# Patient Record
Sex: FEMALE | Race: WHITE | Employment: OTHER | URBAN - METROPOLITAN AREA
[De-identification: names, ages, dates, MRNs, and addresses within clinical notes are randomized per-mention and may not be internally consistent; named-entity substitution may affect disease eponyms.]

---

## 2017-03-17 ENCOUNTER — OFFICE VISIT (OUTPATIENT)
Dept: OBGYN CLINIC | Age: 22
End: 2017-03-17

## 2017-03-17 VITALS
WEIGHT: 180 LBS | SYSTOLIC BLOOD PRESSURE: 118 MMHG | HEIGHT: 66 IN | BODY MASS INDEX: 28.93 KG/M2 | DIASTOLIC BLOOD PRESSURE: 60 MMHG

## 2017-03-17 DIAGNOSIS — Z01.419 ENCOUNTER FOR GYNECOLOGICAL EXAMINATION WITHOUT ABNORMAL FINDING: Primary | ICD-10-CM

## 2017-03-17 RX ORDER — NORGESTREL AND ETHINYL ESTRADIOL 0.3-0.03MG
KIT ORAL
Refills: 0 | COMMUNITY
Start: 2017-02-05 | End: 2017-03-17 | Stop reason: SDUPTHER

## 2017-03-17 NOTE — PATIENT INSTRUCTIONS
Breast Self-Exam: Care Instructions  Your Care Instructions  A breast self-exam is when you check your breasts for lumps or changes. This regular exam helps you learn how your breasts normally look and feel. Most breast problems or changes are not because of cancer. Breast self-exam is not a substitute for a mammogram. Having regular breast exams by your doctor and regular mammograms improve your chances of finding any problems with your breasts. Some women set a time each month to do a step-by-step breast self-exam. Other women like a less formal system. They might look at their breasts as they brush their teeth, or feel their breasts once in a while in the shower. If you notice a change in your breast, tell your doctor. Follow-up care is a key part of your treatment and safety. Be sure to make and go to all appointments, and call your doctor if you are having problems. Its also a good idea to know your test results and keep a list of the medicines you take. How do you do a breast self-exam?  · The best time to examine your breasts is usually one week after your menstrual period begins. Your breasts should not be tender then. If you do not have periods, you might do your exam on a day of the month that is easy to remember. · To examine your breasts:  ¨ Remove all your clothes above the waist and lie down. When you are lying down, your breast tissue spreads evenly over your chest wall, which makes it easier to feel all your breast tissue. ¨ Use the pads--not the fingertips--of the 3 middle fingers of your left hand to check your right breast. Move your fingers slowly in small coin-sized circles that overlap. ¨ Use three levels of pressure to feel of all your breast tissue. Use light pressure to feel the tissue close to the skin surface. Use medium pressure to feel a little deeper. Use firm pressure to feel your tissue close to your breastbone and ribs.  Use each pressure level to feel your breast tissue before moving on to the next spot. ¨ Check your entire breast, moving up and down as if following a strip from the collarbone to the bra line, and from the armpit to the ribs. Repeat until you have covered the entire breast.  ¨ Repeat this procedure for your left breast, using the pads of the 3 middle fingers of your right hand. · To examine your breasts while in the shower:  ¨ Place one arm over your head and lightly soap your breast on that side. ¨ Using the pads of your fingers, gently move your hand over your breast (in the strip pattern described above), feeling carefully for any lumps or changes. ¨ Repeat for the other breast.  · Have your doctor inspect anything you notice to see if you need further testing. Where can you learn more? Go to http://darin-bhargav.info/. Enter P148 in the search box to learn more about \"Breast Self-Exam: Care Instructions. \"  Current as of: July 26, 2016  Content Version: 11.1  © 6931-9038 [employer]. Care instructions adapted under license by [employer] (which disclaims liability or warranty for this information). If you have questions about a medical condition or this instruction, always ask your healthcare professional. [place] [address] any warranty or liability for your use of this information.

## 2017-03-17 NOTE — PROGRESS NOTES
Cate Palmer is a ,  24 y.o. female [facility] whose Patient's last menstrual period was 2017. who presents for her annual checkup. She is having no significant problems. With regard to the Gardisil vaccine, she has not received it yet. Menstrual status:    Her periods are normal in flow. She is using three to five pads or tampons per day, usually regular and occur every 26-30 days. She denies dysmenorrhea. She reports no premenstrual symptoms. Contraception:    The current method of family planning is abstinence. Sexual history:    She has never been sexually active. Medical conditions:    Since her last annual GYN exam about one year ago, she has not the following changes in her health history: none. Pap and Mammogram History:    She has never had a pap smear due to age/protocol. The patient has never had a mammogram.    The patient does have a family history of breast cancer. Past Medical History:   Diagnosis Date    Abnormal thyroid stimulating hormone level     Autism spectrum disorder     pervasive development disorder not otherwise specified, autism    Eczema     Optic nerve disorder     enlarged optic nerve, glaucoma suspect, Dr. Rojas Plankyle disease ([email or web address]     trace bales/gyn-July hematolgy- DR. ZAPIEN     Past Surgical History:   Procedure Laterality Date    HX HEENT      oral surgery; X2    HX WISDOM TEETH EXTRACTION         Current Outpatient Prescriptions   Medication Sig Dispense Refill    LOW-OGESTREL, 28, 0.3-30 mg-mcg tab TK 1 T PO ONCE DAILY  0    fexofenadine (ALLEGRA) 180 mg tablet Take  by mouth daily.  multivitamin (ONE A DAY) tablet Take 1 Tab by mouth daily.  triamcinolone acetonide (KENALOG) 0.1 % ointment   1     Allergies: Milk;  Other medication; and Other plant, animal, environmental   Social History     Social History    Marital status: SINGLE     Spouse name: N/A    Number of children: N/A    Years of education: N/A     Occupational History    Not on file. Social History Main Topics    Smoking status: Never Smoker    Smokeless tobacco: Never Used    Alcohol use No    Drug use: No    Sexual activity: No     Other Topics Concern    Not on file     Social History Narrative    Full time student at [employer], music education,music therapy        Manageable        Sleeps 7 hours/night        Exercise     walking a dog 1 mile     Tobacco History:  reports that she has never smoked. She has never used smokeless tobacco.  Alcohol Abuse:  reports that she does not drink alcohol. Drug Abuse:  reports that she does not use illicit drugs.     Patient Active Problem List   Diagnosis Code    [facility] [place] [facility] D68.0       Review of Systems - History obtained from the patient  Constitutional: negative for weight loss, fever, night sweats  HEENT: negative for hearing loss, earache, congestion, snoring, sorethroat  CV: negative for chest pain, palpitations, edema  Resp: negative for cough, shortness of breath, wheezing  GI: negative for change in bowel habits, abdominal pain, black or bloody stools  : negative for frequency, dysuria, hematuria, vaginal discharge  MSK: negative for back pain, joint pain, muscle pain  Breast: negative for breast lumps, nipple discharge, galactorrhea  Skin :negative for itching, rash, hives  Neuro: negative for dizziness, headache, confusion, weakness  Psych: negative for anxiety, depression, change in mood  Heme/lymph: negative for bleeding, bruising, pallor    Physical Exam    Visit Vitals    /60    Ht 5' 6\" (1.676 m)    Wt 180 lb (81.6 kg)    LMP 03/13/2017    BMI 29.05 kg/m2       Constitutional  · Appearance: well-nourished, well developed, alert, in no acute distress    HENT  · Head and Face: appears normal    Neck  · Inspection/Palpation: normal appearance, no masses or tenderness  · Lymph Nodes: no lymphadenopathy present  · Thyroid: gland size normal, nontender, no nodules or masses present on palpation    Chest  · Respiratory Effort: breathing normal  · Auscultation: normal breath sounds    Cardiovascular  · Heart:  · Auscultation: regular rate and rhythm without murmur    Gastrointestinal  · Abdominal Examination: abdomen non-tender to palpation, normal bowel sounds, no masses present  · Liver and spleen: no hepatomegaly present, spleen not palpable  · Hernias: no hernias identified      Skin  · General Inspection: no rash, no lesions identified    Neurologic/Psychiatric  · Mental Status:  · Orientation: grossly oriented to person, place and time  · Mood and Affect: mood normal, affect appropriate    . Assessment:  Routine gynecologic examination  Her current medical status is satisfactory with no evidence of significant gynecologic issues.   Von Willebrand disease  Plan:  Counseled re: diet, exercise, healthy lifestyle  Return for yearly wellness visits  Gardisil counseling provided  Cont OC

## 2017-06-29 ENCOUNTER — OFFICE VISIT (OUTPATIENT)
Dept: INTERNAL MEDICINE CLINIC | Age: 22
End: 2017-06-29

## 2017-06-29 VITALS
HEIGHT: 66 IN | DIASTOLIC BLOOD PRESSURE: 62 MMHG | OXYGEN SATURATION: 98 % | BODY MASS INDEX: 28.93 KG/M2 | WEIGHT: 180 LBS | SYSTOLIC BLOOD PRESSURE: 109 MMHG | RESPIRATION RATE: 18 BRPM | HEART RATE: 95 BPM | TEMPERATURE: 99.3 F

## 2017-06-29 DIAGNOSIS — Z00.00 WELL ADULT EXAM: Primary | ICD-10-CM

## 2017-06-29 DIAGNOSIS — Z00.00 WELL ADULT EXAM: ICD-10-CM

## 2017-06-29 DIAGNOSIS — D68.00 VON WILLEBRAND DISEASE: ICD-10-CM

## 2017-06-29 NOTE — MR AVS SNAPSHOT
Visit Information Date & Time Provider Department Dept. Phone Encounter #  
 6/29/2017  2:45 PM Jinny Hashimoto, MD [facility] [address] [place] [id number] Upcoming Health Maintenance Date Due  
 HPV AGE 9Y-34Y (1 of 3 - Female 3 Dose Series) 5/6/2005 PAP AKA CERVICAL CYTOLOGY 5/6/2015 INFLUENZA AGE 9 TO ADULT 8/1/2017 DTaP/Tdap/Td series (2 - Td) 1/1/2021 Allergies as of 6/29/2017  Review Complete On: 6/29/2017 By: Jinny Hashimoto, MD  
  
 Severity Noted Reaction Type Reactions Milk  04/10/2015    Nausea and Vomiting Other Medication  01/04/2014   Not Verified Not Reported This Time Neosporin Other Plant, Animal, Environmental  01/04/2014   Not Verified Not Reported This Time Cats and dogs Current Immunizations  Never Reviewed Name Date Influenza Vaccine 10/1/2014 Tdap 1/1/2011 Not reviewed this visit You Were Diagnosed With   
  
 Codes Comments Well adult exam    -  Primary ICD-10-CM: Z00.00 ICD-9-CM: V70.0 Vitals BP Pulse Temp Resp Height(growth percentile) Weight(growth percentile) 109/62 95 99.3 °F (37.4 °C) (Oral) 18 5' 6\" (1.676 m) 180 lb (81.6 kg) LMP SpO2 BMI OB Status Smoking Status 05/15/2017 98% 29.05 kg/m2 Having regular periods Never Smoker Vitals History BMI and BSA Data Body Mass Index Body Surface Area 29.05 kg/m 2 1.95 m 2 Preferred Pharmacy Pharmacy Name Phone [facility] [address] [facility] [phone] Your Updated Medication List  
  
   
This list is accurate as of: 6/29/17  4:06 PM.  Always use your most recent med list. ALLEGRA 180 mg tablet Generic drug:  fexofenadine Take  by mouth daily. multivitamin tablet Commonly known as:  ONE A DAY Take 1 Tab by mouth daily. norgestrel-ethinyl estradiol 0.3-30 mg-mcg Tab Commonly known as:  LOW-OGESTREL (28) Take 1 Tab by mouth daily. To-Do List   
 06/29/2017 Lab:  LIPID PANEL   
  
 06/29/2017 Lab:  METABOLIC PANEL, COMPREHENSIVE Please provide this summary of care documentation to your next provider. Your primary care clinician is listed as Esme Lujan. If you have any questions after today's visit, please call [phone].

## 2017-06-29 NOTE — PROGRESS NOTES
Ron Fallon is a 20 y.o. female and presents with Complete Physical (forms filled out for college)  . Subjective:  Pt and sister, Aki, presents for pt's visit. Family moved to area 7 years ago. Lived in [place], [place]  Pt has diagnosis of Von Willebrands disease and was followed by [facility] but will go to community      Von willebrands disease  Not required Ddavp over past year  Pre surgery requires ddavp, obtains from hematologist, Dr. Nohelia Shook  Pt sees Dr. Otoniel Reis for [facility]    Exercising: On line gym classes: step classes        Review of Systems  Constitutional: negative for fevers, chills, anorexia and weight loss  Eyes:   negative for visual disturbance and irritation  ENT:   negative for tinnitus,sore throat,nasal congestion,ear pains. hoarseness  Respiratory:  negative for cough, hemoptysis, dyspnea,wheezing  CV:   negative for chest pain, palpitations, lower extremity edema  GI:   negative for nausea, vomiting, diarrhea, abdominal pain,melena  Endo:               negative for polyuria,polydipsia,polyphagia,heat intolerance  Genitourinary: negative for frequency, dysuria and hematuria  Integument:  negative for rash and pruritus  Hematologic:  negative for easy bruising and gum/nose bleeding  Musculoskel: negative for myalgias, arthralgias, back pain, muscle weakness, joint pain  Neurological:  negative for headaches, dizziness, vertigo, memory problems and gait   Behavl/Psych: negative for feelings of anxiety, depression, mood changes    Past Medical History:   Diagnosis Date    Abnormal thyroid stimulating hormone level     Autism spectrum disorder     pervasive development disorder not otherwise specified, autism    Eczema     Optic nerve disorder     enlarged optic nerve, glaucoma suspect, Dr. Gleason Rear disease ([facility]     trace bales/gyn-July hematolgy- DR. ZAPIEN     Past Surgical History:   Procedure Laterality Date    HX HEENT      oral surgery; X2  HX WISDOM TEETH EXTRACTION       Social History     Social History    Marital status: SINGLE     Spouse name: N/A    Number of children: N/A    Years of education: N/A     Social History Main Topics    Smoking status: Never Smoker    Smokeless tobacco: Never Used    Alcohol use No    Drug use: No    Sexual activity: No     Other Topics Concern    None     Social History Narrative    Full time student at [employer], music education,music therapy        Manageable        Sleeps 7 hours/night        Exercise     walking a dog 1 mile     Family History   Problem Relation Age of Onset    Hypertension Mother     High Cholesterol Mother     Thyroid Disease Mother     Breast Cancer Mother 39    Cancer Mother      breast/thyroid    Diabetes Father     Hypertension Father     High Cholesterol Father     Thyroid Disease Father     Heart Disease Maternal Grandmother     Cancer Maternal Grandmother      colon cancer    Heart Disease Maternal Grandfather     Diabetes Maternal Grandfather     Heart Attack Maternal Grandfather     Diabetes Sister      Current Outpatient Prescriptions   Medication Sig Dispense Refill    norgestrel-ethinyl estradiol (LOW-OGESTREL, 28,) 0.3-30 mg-mcg tab Take 1 Tab by mouth daily. 3 Package 4    fexofenadine (ALLEGRA) 180 mg tablet Take  by mouth daily.  multivitamin (ONE A DAY) tablet Take 1 Tab by mouth daily.        Allergies   Allergen Reactions    Milk Nausea and Vomiting    Other Medication Not Reported This Time     Neosporin    Other Plant, Animal, Environmental Not Reported This Time     Cats and dogs       Objective:  Visit Vitals    /89    Pulse 95    Temp 99.3 °F (37.4 °C) (Oral)    Resp 18    Ht 5' 6\" (1.676 m)    Wt 180 lb (81.6 kg)    LMP 05/15/2017    SpO2 98%    BMI 29.05 kg/m2     Physical Exam:   General appearance - alert, well appearing, and in no distress  Mental status - alert, oriented to person, place, and time  EYE-EMRE, EOMI, corneas normal, no foreign bodies, visual acuity normal both eyes, no periorbital cellulitis  ENT-ENT exam normal, no neck nodes or sinus tenderness  Nose - normal and patent, no erythema, discharge or polyps  Mouth - mucous membranes moist, pharynx normal without lesions  Neck - supple, no significant adenopathy, fullness right thyroid  Chest - clear to auscultation, no wheezes, rales or rhonchi, symmetric air entry   Heart - normal rate, regular rhythm, normal S1, S2, no murmurs, rubs, clicks or gallops   Abdomen - soft, nontender, nondistended, no masses or organomegaly  Lymph- no adenopathy palpable  Ext-peripheral pulses normal, no pedal edema, no clubbing or cyanosis  Skin-Warm and dry. no hyperpigmentation, vitiligo, or suspicious lesions  Neuro -alert, oriented, normal speech, no focal findings or movement disorder noted  Neck-normal C-spine, no tenderness, full ROM without pain      Results for orders placed or performed in visit on 04/10/15   LIPID PANEL   Result Value Ref Range    Cholesterol, total 168 100 - 189 mg/dL    Triglyceride 74 0 - 114 mg/dL    HDL Cholesterol 39 (L) >39 mg/dL    VLDL, calculated 15 5 - 40 mg/dL    LDL, calculated 114 0 - 119 mg/dL   TSH, 3RD GENERATION   Result Value Ref Range    TSH 2.140 0.450 - 9.336 uIU/mL   METABOLIC PANEL, COMPREHENSIVE   Result Value Ref Range    Glucose 89 65 - 99 mg/dL    BUN 8 6 - 20 mg/dL    Creatinine 0.62 0.57 - 1.00 mg/dL    GFR est non- >59 mL/min/1.73    GFR est  >59 mL/min/1.73    BUN/Creatinine ratio 13 8 - 20    Sodium 139 134 - 144 mmol/L    Potassium 4.3 3.5 - 5.2 mmol/L    Chloride 101 97 - 108 mmol/L    CO2 23 18 - 29 mmol/L    Calcium 9.6 8.7 - 10.2 mg/dL    Protein, total 7.6 6.0 - 8.5 g/dL    Albumin 4.7 3.5 - 5.5 g/dL    GLOBULIN, TOTAL 2.9 1.5 - 4.5 g/dL    A-G Ratio 1.6 1.1 - 2.5    Bilirubin, total 0.4 0.0 - 1.2 mg/dL    Alk.  phosphatase 55 39 - 117 IU/L    AST (SGOT) 18 0 - 40 IU/L    ALT (SGPT) 16 0 - 32 IU/L   HEMOGLOBIN A1C   Result Value Ref Range    Hemoglobin A1c 5.4 4.8 - 5.6 %   CVD REPORT   Result Value Ref Range    INTERPRETATION Note      Prevention    Cardiovascular profile  Family hx  Exercising: On line gym classes  Blood pressure:  Health healthy diet:  Diabetes:  Cholesterol:  Renal function:      Cancer risk profile  Mammogram  PSA  Lung  Colonoscopy  Skin nonhealing in 2 weeks  Gyn abnormal bleeding/discharge/abd pain/pressure      Thyroid sx    Osteopenia prevention  Calcium 1000mg/day yes  Vitamin D 800iu/day yes  Milk allergy    Mental health scale: 9/10 Depression  Anxiety  Sleep # of hours:  Energy Level:        Immunizations  TDAP  Pneumonia vaccine  Flu vaccine  Shingles vaccine  HPV      Glenn Elias was seen today for complete physical.    Diagnoses and all orders for this visit:    Well adult exam  -     METABOLIC PANEL, COMPREHENSIVE; Future  -     LIPID PANEL; Future    Von Willebrand disease ([facility]  Stable  Follow up with hematology    This note will not be viewable in [address]

## 2017-07-19 LAB
ALBUMIN SERPL-MCNC: 4.5 G/DL (ref 3.5–5.5)
ALBUMIN/GLOB SERPL: 1.4 {RATIO} (ref 1.2–2.2)
ALP SERPL-CCNC: 56 IU/L (ref 39–117)
ALT SERPL-CCNC: 16 IU/L (ref 0–32)
AST SERPL-CCNC: 15 IU/L (ref 0–40)
BILIRUB SERPL-MCNC: <0.2 MG/DL (ref 0–1.2)
BUN SERPL-MCNC: 8 MG/DL (ref 6–20)
BUN/CREAT SERPL: 12 (ref 9–23)
CALCIUM SERPL-MCNC: 9.6 MG/DL (ref 8.7–10.2)
CHLORIDE SERPL-SCNC: 100 MMOL/L (ref 96–106)
CHOLEST SERPL-MCNC: 159 MG/DL (ref 100–199)
CO2 SERPL-SCNC: 21 MMOL/L (ref 18–29)
CREAT SERPL-MCNC: 0.66 MG/DL (ref 0.57–1)
GLOBULIN SER CALC-MCNC: 3.2 G/DL (ref 1.5–4.5)
GLUCOSE SERPL-MCNC: 91 MG/DL (ref 65–99)
HDLC SERPL-MCNC: 42 MG/DL
INTERPRETATION, 910389: NORMAL
LDLC SERPL CALC-MCNC: 103 MG/DL (ref 0–99)
POTASSIUM SERPL-SCNC: 4.3 MMOL/L (ref 3.5–5.2)
PROT SERPL-MCNC: 7.7 G/DL (ref 6–8.5)
SODIUM SERPL-SCNC: 140 MMOL/L (ref 134–144)
TRIGL SERPL-MCNC: 69 MG/DL (ref 0–149)
VLDLC SERPL CALC-MCNC: 14 MG/DL (ref 5–40)

## 2018-02-05 ENCOUNTER — TELEPHONE (OUTPATIENT)
Dept: OBGYN CLINIC | Age: 23
End: 2018-02-05

## 2018-02-05 NOTE — TELEPHONE ENCOUNTER
Call received ate 10:42am      CLARITASHRAVAN verified to speak to Scooter Coulter, mother, regarding her daughter. 20year old patient last seen in the office on 3/17/18. Patient is on the Autism spectrum. Patient also has Raiza Cozier Disease . Mother calling to say that her daughter takes Low ogestrel 29. Mother states patient has been bleeding for 8 days and she is 4 pill away from the placebo pills when her cycle will come on Mother reports her cycle is heavy. .Mother reports the bleeding is mid afternoon till she goes to bed. Mother reports no bleeding over night and in the am. Mother is not sure how often pad her tampon is changed. Mother is concerned due to past history of having low hemoglobin 5.5 and having to have transfusion.       Please advise

## 2018-02-05 NOTE — TELEPHONE ENCOUNTER
Is this the first pack where she has had BTB? If so, go through another pack. If it's happened before I will change this pill. How heavy is the bleeding?  How many pads/day

## 2018-02-06 NOTE — TELEPHONE ENCOUNTER
HIPAA verified to speak to Governor Moors, regarding her daughter. Mother advised of MD recommendations. Mother reports this is the first time the BTB has happened since taking this ocp for 12 years. Mother reports that her daughter only uses one pad for the time she is bleeding. Mother still concerned regarding period to come.        Mother wondering if should advised her daughter's hematologist , ?is this bleeding a change in her [address]      Please advise

## 2018-02-06 NOTE — TELEPHONE ENCOUNTER
No it is just related to the pill. She is barely bleeding at one pad per day. It's due to thinning of the lining. If she is concerned and wants to come in this week please schedule.  Otherwise, let me know if she had BTB again next pack and I will switch the pill

## 2018-02-06 NOTE — TELEPHONE ENCOUNTER
HIPAA verified to speak to speak to Jonathan Cazaresmadhu , regarding her daughter. Mother advised of MD recommendations and verbalized understanding.

## 2018-03-27 ENCOUNTER — OFFICE VISIT (OUTPATIENT)
Dept: OBGYN CLINIC | Age: 23
End: 2018-03-27

## 2018-03-27 VITALS
SYSTOLIC BLOOD PRESSURE: 124 MMHG | DIASTOLIC BLOOD PRESSURE: 76 MMHG | HEIGHT: 66 IN | BODY MASS INDEX: 25.91 KG/M2 | WEIGHT: 161.2 LBS

## 2018-03-27 DIAGNOSIS — Z01.419 ENCOUNTER FOR GYNECOLOGICAL EXAMINATION (GENERAL) (ROUTINE) WITHOUT ABNORMAL FINDINGS: Primary | ICD-10-CM

## 2018-03-27 DIAGNOSIS — D68.00 VON WILLEBRAND DISEASE: ICD-10-CM

## 2018-03-27 NOTE — PATIENT INSTRUCTIONS
Breast Self-Exam: Care Instructions  Your Care Instructions    A breast self-exam is when you check your breasts for lumps or changes. This regular exam helps you learn how your breasts normally look and feel. Most breast problems or changes are not because of cancer. Breast self-exam is not a substitute for a mammogram. Having regular breast exams by your doctor and regular mammograms improve your chances of finding any problems with your breasts. Some women set a time each month to do a step-by-step breast self-exam. Other women like a less formal system. They might look at their breasts as they brush their teeth, or feel their breasts once in a while in the shower. If you notice a change in your breast, tell your doctor. Follow-up care is a key part of your treatment and safety. Be sure to make and go to all appointments, and call your doctor if you are having problems. It's also a good idea to know your test results and keep a list of the medicines you take. How do you do a breast self-exam?  · The best time to examine your breasts is usually one week after your menstrual period begins. Your breasts should not be tender then. If you do not have periods, you might do your exam on a day of the month that is easy to remember. · To examine your breasts:  ¨ Remove all your clothes above the waist and lie down. When you are lying down, your breast tissue spreads evenly over your chest wall, which makes it easier to feel all your breast tissue. ¨ Use the pads-not the fingertips-of the 3 middle fingers of your left hand to check your right breast. Move your fingers slowly in small coin-sized circles that overlap. ¨ Use three levels of pressure to feel of all your breast tissue. Use light pressure to feel the tissue close to the skin surface. Use medium pressure to feel a little deeper. Use firm pressure to feel your tissue close to your breastbone and ribs.  Use each pressure level to feel your breast tissue before moving on to the next spot. ¨ Check your entire breast, moving up and down as if following a strip from the collarbone to the bra line, and from the armpit to the ribs. Repeat until you have covered the entire breast.  ¨ Repeat this procedure for your left breast, using the pads of the 3 middle fingers of your right hand. · To examine your breasts while in the shower:  ¨ Place one arm over your head and lightly soap your breast on that side. ¨ Using the pads of your fingers, gently move your hand over your breast (in the strip pattern described above), feeling carefully for any lumps or changes. ¨ Repeat for the other breast.  · Have your doctor inspect anything you notice to see if you need further testing. Where can you learn more? Go to http://darin-bhargav.info/. Enter P148 in the search box to learn more about \"Breast Self-Exam: Care Instructions. \"  Current as of: May 12, 2017  Content Version: 11.4  © 4369-0921 [employer]. Care instructions adapted under license by [employer] (which disclaims liability or warranty for this information). If you have questions about a medical condition or this instruction, always ask your healthcare professional. [place] [address] any warranty or liability for your use of this information.

## 2018-03-27 NOTE — PROGRESS NOTES
Ryan James is a ,  20 y.o. female [facility] whose Patient's last menstrual period was 2018 (approximate). who presents for her annual checkup. She is having no significant problems. With regard to the Gardisil vaccine, she declines to receive it. Menstrual status:    Her periods are moderate in flow. She is using three to five pads or tampons per day, usually regular every 26-30 days. She denies dysmenorrhea. She reports no premenstrual symptoms. Contraception:    The current method of family planning is abstinence and She declines contraception and counseling. Sexual history:    She  reports that she does not engage in sexual activity. Medical conditions:    Since her last annual GYN exam about 3/17/17 ago, she has not the following changes in her health history: none. Pap and Mammogram History:    She has never had a pap smear due to age/protocol. The patient has never had a mammogram.    The patient does not have a family history of breast cancer. Past Medical History:   Diagnosis Date    Abnormal thyroid stimulating hormone level     Autism spectrum disorder     pervasive development disorder not otherwise specified, autism    Eczema     Optic nerve disorder     enlarged optic nerve, glaucoma suspect, Dr. Martinez Loosen disease ([facility] [address]     trace bales/gyn-July hematolgy- DR. ZAPIEN     Past Surgical History:   Procedure Laterality Date    HX HEENT      oral surgery; X2    HX WISDOM TEETH EXTRACTION         Current Outpatient Prescriptions   Medication Sig Dispense Refill    LOW-OGESTREL, 28, 0.3-30 mg-mcg tab TAKE 1 TABLET BY MOUTH DAILY 1 Package 0    fexofenadine (ALLEGRA) 180 mg tablet Take  by mouth daily.  multivitamin (ONE A DAY) tablet Take 1 Tab by mouth daily. Allergies: Milk;  Other medication; and Other plant, animal, environmental   Social History     Social History    Marital status: SINGLE     Spouse name: N/A  Number of children: N/A    Years of education: N/A     Occupational History    Not on file. Social History Main Topics    Smoking status: Never Smoker    Smokeless tobacco: Never Used    Alcohol use No    Drug use: No    Sexual activity: No     Other Topics Concern    Not on file     Social History Narrative    Full time student at [employer], music education            Manageable        Sleeps 7 hours/night        Exercise     walking a dog 1 mile     Tobacco History:  reports that she has never smoked. She has never used smokeless tobacco.  Alcohol Abuse:  reports that she does not drink alcohol. Drug Abuse:  reports that she does not use illicit drugs.     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.0       Review of Systems - History obtained from the patient  Constitutional: negative for weight loss, fever, night sweats  HEENT: negative for hearing loss, earache, congestion, snoring, sorethroat  CV: negative for chest pain, palpitations, edema  Resp: negative for cough, shortness of breath, wheezing  GI: negative for change in bowel habits, abdominal pain, black or bloody stools  : negative for frequency, dysuria, hematuria, vaginal discharge  MSK: negative for back pain, joint pain, muscle pain  Breast: negative for breast lumps, nipple discharge, galactorrhea  Skin :negative for itching, rash, hives  Neuro: negative for dizziness, headache, confusion, weakness  Psych: negative for anxiety, depression, change in mood  Heme/lymph: negative for bleeding, bruising, pallor    Physical Exam    Visit Vitals    /76    Ht 5' 6\" (1.676 m)    Wt 161 lb 3.2 oz (73.1 kg)    LMP 03/13/2018 (Approximate)    BMI 26.02 kg/m2       Constitutional  · Appearance: well-nourished, well developed, alert, in no acute distress    HENT  · Head and Face: appears normal    Neck  · Inspection/Palpation: normal appearance, no masses or tenderness  · Lymph Nodes: no lymphadenopathy present  · Thyroid: gland size normal, nontender, no nodules or masses present on palpation    Chest  · Respiratory Effort: breathing normal  · Auscultation: normal breath sounds    Cardiovascular  · Heart:  · Auscultation: regular rate and rhythm without murmur    Breasts  · Inspection of Breasts: breasts symmetrical, no skin changes, no discharge present, nipple appearance normal, no skin retraction present  · Palpation of Breasts and Axillae: no masses present on palpation, no breast tenderness  · Axillary Lymph Nodes: no lymphadenopathy present    Gastrointestinal  · Abdominal Examination: abdomen non-tender to palpation, normal bowel sounds, no masses present  · Liver and spleen: no hepatomegaly present, spleen not palpable  · Hernias: no hernias identified      Skin  · General Inspection: no rash, no lesions identified    Neurologic/Psychiatric  · Mental Status:  · Orientation: grossly oriented to person, place and time  · Mood and Affect: mood normal, affect appropriate    . Assessment:  Routine gynecologic examination  Her current medical status is satisfactory with no evidence of significant gynecologic issues.   Hx of von Willebrands disease  Plan:  Counseled re: diet, exercise, healthy lifestyle  Return for yearly wellness visits  Declines pap - never SA  Cont OC

## 2018-07-26 ENCOUNTER — OFFICE VISIT (OUTPATIENT)
Dept: INTERNAL MEDICINE CLINIC | Age: 23
End: 2018-07-26

## 2018-07-26 VITALS
OXYGEN SATURATION: 100 % | HEIGHT: 66 IN | WEIGHT: 162.6 LBS | RESPIRATION RATE: 12 BRPM | TEMPERATURE: 98 F | BODY MASS INDEX: 26.13 KG/M2 | HEART RATE: 81 BPM | DIASTOLIC BLOOD PRESSURE: 85 MMHG | SYSTOLIC BLOOD PRESSURE: 133 MMHG

## 2018-07-26 DIAGNOSIS — D68.00 VON WILLEBRAND DISEASE: ICD-10-CM

## 2018-07-26 DIAGNOSIS — Z00.00 WELL WOMAN EXAM (NO GYNECOLOGICAL EXAM): Primary | ICD-10-CM

## 2018-07-26 DIAGNOSIS — Z83.3 FAMILY HISTORY OF DIABETES MELLITUS (DM): ICD-10-CM

## 2018-07-26 NOTE — PATIENT INSTRUCTIONS
Learning About [address] Disease  What is von Willebrand's disease? [address] disease is a bleeding disorder. When you have this problem, it takes longer for your blood to form clots, so you bleed for a longer time than other people. Normally when a person starts to bleed, small blood cells called platelets go to the site of the bleeding. These cells clump together to help stop the bleeding. If you have von Willebrand's disease, your blood doesn't clot well. This happens because you don't have a certain protein in your blood. Or you may have low levels of the protein or a form of it that's not normal. The protein is called the von Willebrand factor. It helps your blood to clot by helping the platelets stick together. The disease can range from mild to severe. It is mild in most people. It can stay the same or get better or worse as you get older. What causes it? [address] disease usually is passed down through families (inherited). If you have the disease, your doctor may suggest that your family members get tested for it too. It's also possible to get the disease later in life. This is called acquired von Willebrand's disease. This rare form of the disease isn't inherited. Instead, it seems to be caused by certain diseases or certain medicines that cause a problem with your immune system. Your body makes antibodies that affect the von Willebrand protein in your blood. What are the symptoms? Bleeding a lot is the main symptom of von Willebrand's disease. How severe the bleeding is will be different for each person. When the disease is mild, symptoms include:  · Frequent nosebleeds. · Some bleeding from the gums. · Heavy menstrual periods in women. · Bruises that appear for no reason. · Heavy bleeding after an injury or surgery. When the disease is more severe, you may also have:  · Blood in the urine. · Bruising easily. · Black, tarry, or bloody stools.   · Bleeding into the joints, which causes stiffness, pain, and swelling. This symptom is rare. How is the disease treated? If you have severe von Willebrand's disease, your treatment may include:  · Medicine that helps your body release more of the protein that helps your blood to clot. · Replacement therapy, which replaces the protein that helps your blood to clot. · Medicines that help stop blood clots from breaking down. · Birth control pills, or an intrauterine device (IUD) that contains hormones. These treatments help control heavy menstrual periods. · Fibrin glue or thrombin powder, which you place on a wound to help control bleeding. Be safe with medicines. Take your medicines exactly as prescribed. Call your doctor if you think you are having a problem with your medicine. You may take medicine to prevent heavy bleeding if you have an injury, are going to have surgery, or are about to give birth. Self-care  You may need to avoid nonsteroidal anti-inflammatory drugs (NSAIDs). These medicines include aspirin, ibuprofen (such as Advil or Motrin), and naproxen (Aleve). You also may need to avoid medicines (called blood thinners) that prevent blood clots. Tell all your doctors and other health professionals, such as your dentist, that you have this disease. Doctors need to know about it before you have any procedures, because you may be at risk for dangerous bleeding. Wear medical alert jewelry. This lets others know that you have a bleeding disorder. You can buy it at most drugstores. Avoid sports or activities where injury and bleeding are likely. When should you call for help? Call 911 anytime you think you may need emergency care. For example, call if:  · You passed out (lost consciousness). · You have signs of severe bleeding, which includes:  ¨ You have a severe headache that is different from past headaches. ¨ You vomit blood or what looks like coffee grounds. ¨ Your stools are maroon or very bloody.   Call your doctor now or seek immediate medical care if:  · You are dizzy or lightheaded, or you feel like you may faint. · You have abnormal bleeding, such as:  ¨ Your stools are black and look like tar, or they have streaks of blood. ¨ You have blood in your urine. ¨ You have joint pain. ¨ You have bruises or blood spots under your skin. Watch closely for changes in your health, and be sure to contact your doctor if:  · You do not get better as expected. Follow-up care is a key part of your treatment and safety. Be sure to make and go to all appointments, and call your doctor if you are having problems. It's also a good idea to know your test results and keep a list of the medicines you take. Where can you learn more? Go to http://darin-bhargav.info/. Enter Y915 in the search box to learn more about \"Learning About Von Willebrand's Disease. \"  Current as of: October 9, 2017  Content Version: 11.7  © 4193-1990 [employer]. Care instructions adapted under license by [employer] (which disclaims liability or warranty for this information). If you have questions about a medical condition or this instruction, always ask your healthcare professional. [place] [address] any warranty or liability for your use of this information. Well Visit, Ages 25 to 48: Care Instructions  Your Care Instructions    Physical exams can help you stay healthy. Your doctor has checked your overall health and may have suggested ways to take good care of yourself. He or she also may have recommended tests. At home, you can help prevent illness with healthy eating, regular exercise, and other steps. Follow-up care is a key part of your treatment and safety. Be sure to make and go to all appointments, and call your doctor if you are having problems. It's also a good idea to know your test results and keep a list of the medicines you take.   How can you care for yourself at home?  · Reach and stay at a healthy weight. This will lower your risk for many problems, such as obesity, diabetes, heart disease, and high blood pressure. · Get at least 30 minutes of physical activity on most days of the week. Walking is a good choice. You also may want to do other activities, such as running, swimming, cycling, or playing tennis or team sports. Discuss any changes in your exercise program with your doctor. · Do not smoke or allow others to smoke around you. If you need help quitting, talk to your doctor about stop-smoking programs and medicines. These can increase your chances of quitting for good. · Talk to your doctor about whether you have any risk factors for sexually transmitted infections (STIs). Having one sex partner (who does not have STIs and does not have sex with anyone else) is a good way to avoid these infections. · Use birth control if you do not want to have children at this time. Talk with your doctor about the choices available and what might be best for you. · Protect your skin from too much sun. When you're outdoors from 10 a.m. to 4 p.m., stay in the shade or cover up with clothing and a hat with a wide brim. Wear sunglasses that block UV rays. Even when it's cloudy, put broad-spectrum sunscreen (SPF 30 or higher) on any exposed skin. · See a dentist one or two times a year for checkups and to have your teeth cleaned. · Wear a seat belt in the car. · Drink alcohol in moderation, if at all. That means no more than 2 drinks a day for men and 1 drink a day for women. Follow your doctor's advice about when to have certain tests. These tests can spot problems early. For everyone  · Cholesterol. Have the fat (cholesterol) in your blood tested after age 21. Your doctor will tell you how often to have this done based on your age, family history, or other things that can increase your risk for heart disease. · Blood pressure.  Have your blood pressure checked during a routine doctor visit. Your doctor will tell you how often to check your blood pressure based on your age, your blood pressure results, and other factors. · Vision. Talk with your doctor about how often to have a glaucoma test.  · Diabetes. Ask your doctor whether you should have tests for diabetes. · Colon cancer. Have a test for colon cancer at age 48. You may have one of several tests. If you are younger than 48, you may need a test earlier if you have any risk factors. Risk factors include whether you already had a precancerous polyp removed from your colon or whether your parent, brother, sister, or child has had colon cancer. For women  · Breast exam and mammogram. Talk to your doctor about when you should have a clinical breast exam and a mammogram. Medical experts differ on whether and how often women under 50 should have these tests. Your doctor can help you decide what is right for you. · Pap test and pelvic exam. Begin Pap tests at age 24. A Pap test is the best way to find cervical cancer. The test often is part of a pelvic exam. Ask how often to have this test.  · Tests for sexually transmitted infections (STIs). Ask whether you should have tests for STIs. You may be at risk if you have sex with more than one person, especially if your partners do not wear condoms. For men  · Tests for sexually transmitted infections (STIs). Ask whether you should have tests for STIs. You may be at risk if you have sex with more than one person, especially if you do not wear a condom. · Testicular cancer exam. Ask your doctor whether you should check your testicles regularly. · Prostate exam. Talk to your doctor about whether you should have a blood test (called a PSA test) for prostate cancer. Experts differ on whether and when men should have this test. Some experts suggest it if you are older than 39 and are -American or have a father or brother who got prostate cancer when he was younger than 72.   When should you call for help? Watch closely for changes in your health, and be sure to contact your doctor if you have any problems or symptoms that concern you. Where can you learn more? Go to http://darin-bhargav.info/. Enter P072 in the search box to learn more about \"Well Visit, Ages 25 to 48: Care Instructions. \"  Current as of: May 16, 2017  Content Version: 11.7  © 3252-1143 [employer]. Care instructions adapted under license by [employer] (which disclaims liability or warranty for this information). If you have questions about a medical condition or this instruction, always ask your healthcare professional. [address] any warranty or liability for your use of this information.

## 2018-07-26 NOTE — MR AVS SNAPSHOT
[facility] 
 
 
 [address] Kirstin Gonsales 100[facility] 
[phone] Patient: Kelli Koehler MRN: [id number] :1995 Visit Information Date & Time Provider Department Dept. Phone Encounter #  
 2018 10:00 AM Margarita Valdivia NP [facility] [address] [id number] Upcoming Health Maintenance Date Due  
 HPV Age 9Y-34Y (1 of 3 - Female 3 Dose Series) 2005 PAP AKA CERVICAL CYTOLOGY 2015 Influenza Age 5 to Adult 2018 DTaP/Tdap/Td series (2 - Td) 2021 Allergies as of 2018  Review Complete On: 2018 By: Margarita Valdivia NP Severity Noted Reaction Type Reactions Milk  04/10/2015    Nausea and Vomiting Other Medication  2014   Topical Rash Neosporin Other Plant, Animal, Environmental  2014   Not Verified Not Reported This Time Cats and dogs Current Immunizations  Reviewed on 2018 Name Date Influenza Vaccine 10/1/2014 Tdap 2011 Reviewed by Margarita Valdivia NP on 2018 at 10:41 AM  
You Were Diagnosed With   
  
 Codes Comments Well woman exam (no gynecological exam)    -  Primary ICD-10-CM: Z00.00 ICD-9-CM: V70.0 [facility] Willebrand disease [facility]     ICD-10-CM: D68.0 ICD-9-CM: 286. 4 Vitals BP Pulse Temp Resp Height(growth percentile) Weight(growth percentile) 133/85 (BP 1 Location: Left arm, BP Patient Position: Sitting) 81 98 °F (36.7 °C) (Oral) 12 5' 6\" (1.676 m) 162 lb 9.6 oz (73.8 kg) LMP SpO2 BMI OB Status Smoking Status 2018 (Approximate) 100% 26.24 kg/m2 Having regular periods Never Smoker BMI and BSA Data Body Mass Index Body Surface Area  
 26.24 kg/m 2 1.85 m 2 Preferred Pharmacy Pharmacy Name Phone [id number] [place], [address] AT [facility] [phone] Your Updated Medication List  
  
   
 This list is accurate as of 7/26/18 10:49 AM.  Always use your most recent med list. ALLEGRA 180 mg tablet Generic drug:  fexofenadine Take  by mouth daily. multivitamin tablet Commonly known as:  ONE A DAY Take 1 Tab by mouth daily. norgestrel-ethinyl estradiol 0.3-30 mg-mcg Tab Commonly known as:  LOW-OGESTREL (28) TAKE 1 TABLET BY MOUTH DAILY We Performed the Following CBC WITH AUTOMATED DIFF [89568 CPT(R)] LIPID PANEL [53197 CPT(R)] METABOLIC PANEL, COMPREHENSIVE [62281 CPT(R)] REFERRAL TO HEMATOLOGY ONCOLOGY [OCL44 Custom] TSH 3RD GENERATION [17169 CPT(R)] URINALYSIS W/ RFLX MICROSCOPIC [98697 CPT(R)] Referral Information Referral ID Referred By Referred To  
  
 [id number] Emilio Jones MD   
   [address] [place] [address], [address] [place] [address] Phone: [phone] Fax: [phone] Visits Status Start Date End Date 1 New Request 7/26/18 7/26/19 If your referral has a status of pending review or denied, additional information will be sent to support the outcome of this decision. Patient Instructions Learning About [address] Disease What is von Willebrand's disease? [address] disease is a bleeding disorder. When you have this problem, it takes longer for your blood to form clots, so you bleed for a longer time than other people. Normally when a person starts to bleed, small blood cells called platelets go to the site of the bleeding. These cells clump together to help stop the bleeding. If you have von Willebrand's disease, your blood doesn't clot well. This happens because you don't have a certain protein in your blood. Or you may have low levels of the protein or a form of it that's not normal. The protein is called the von Willebrand factor. It helps your blood to clot by helping the platelets stick together. The disease can range from mild to severe. It is mild in most people. It can stay the same or get better or worse as you get older. What causes it? [address] disease usually is passed down through families (inherited). If you have the disease, your doctor may suggest that your family members get tested for it too. It's also possible to get the disease later in life. This is called acquired von Willebrand's disease. This rare form of the disease isn't inherited. Instead, it seems to be caused by certain diseases or certain medicines that cause a problem with your immune system. Your body makes antibodies that affect the von Willebrand protein in your blood. What are the symptoms? Bleeding a lot is the main symptom of von Willebrand's disease. How severe the bleeding is will be different for each person. When the disease is mild, symptoms include: · Frequent nosebleeds. · Some bleeding from the gums. · Heavy menstrual periods in women. · Bruises that appear for no reason. · Heavy bleeding after an injury or surgery. When the disease is more severe, you may also have: · Blood in the urine. · Bruising easily. · Black, tarry, or bloody stools. · Bleeding into the joints, which causes stiffness, pain, and swelling. This symptom is rare. How is the disease treated? If you have severe von Willebrand's disease, your treatment may include: · Medicine that helps your body release more of the protein that helps your blood to clot. · Replacement therapy, which replaces the protein that helps your blood to clot. · Medicines that help stop blood clots from breaking down. · Birth control pills, or an intrauterine device (IUD) that contains hormones. These treatments help control heavy menstrual periods. · Fibrin glue or thrombin powder, which you place on a wound to help control bleeding. Be safe with medicines. Take your medicines exactly as prescribed.  Call your doctor if you think you are having a problem with your medicine. You may take medicine to prevent heavy bleeding if you have an injury, are going to have surgery, or are about to give birth. Self-care You may need to avoid nonsteroidal anti-inflammatory drugs (NSAIDs). These medicines include aspirin, ibuprofen (such as Advil or Motrin), and naproxen (Aleve). You also may need to avoid medicines (called blood thinners) that prevent blood clots. Tell all your doctors and other health professionals, such as your dentist, that you have this disease. Doctors need to know about it before you have any procedures, because you may be at risk for dangerous bleeding. Wear medical alert jewelry. This lets others know that you have a bleeding disorder. You can buy it at most drugstores. Avoid sports or activities where injury and bleeding are likely. When should you call for help? Call 911 anytime you think you may need emergency care. For example, call if: 
· You passed out (lost consciousness). · You have signs of severe bleeding, which includes: 
¨ You have a severe headache that is different from past headaches. ¨ You vomit blood or what looks like coffee grounds. ¨ Your stools are maroon or very bloody. Call your doctor now or seek immediate medical care if: 
· You are dizzy or lightheaded, or you feel like you may faint. · You have abnormal bleeding, such as: 
¨ Your stools are black and look like tar, or they have streaks of blood. ¨ You have blood in your urine. ¨ You have joint pain. ¨ You have bruises or blood spots under your skin. Watch closely for changes in your health, and be sure to contact your doctor if: 
· You do not get better as expected. Follow-up care is a key part of your treatment and safety. Be sure to make and go to all appointments, and call your doctor if you are having problems. It's also a good idea to know your test results and keep a list of the medicines you take. Where can you learn more? Go to http://darin-bhargav.info/. Enter I234 in the search box to learn more about \"Learning About Von Willebrand's Disease. \" Current as of: October 9, 2017 Content Version: 11.7 © 5546-0059 [employer]. Care instructions adapted under license by [employer] (which disclaims liability or warranty for this information). If you have questions about a medical condition or this instruction, always ask your healthcare professional. [address] any warranty or liability for your use of this information. Well Visit, Ages 25 to 48: Care Instructions Your Care Instructions Physical exams can help you stay healthy. Your doctor has checked your overall health and may have suggested ways to take good care of yourself. He or she also may have recommended tests. At home, you can help prevent illness with healthy eating, regular exercise, and other steps. Follow-up care is a key part of your treatment and safety. Be sure to make and go to all appointments, and call your doctor if you are having problems. It's also a good idea to know your test results and keep a list of the medicines you take. How can you care for yourself at home? · Reach and stay at a healthy weight. This will lower your risk for many problems, such as obesity, diabetes, heart disease, and high blood pressure. · Get at least 30 minutes of physical activity on most days of the week. Walking is a good choice. You also may want to do other activities, such as running, swimming, cycling, or playing tennis or team sports. Discuss any changes in your exercise program with your doctor. · Do not smoke or allow others to smoke around you. If you need help quitting, talk to your doctor about stop-smoking programs and medicines. These can increase your chances of quitting for good.  
· Talk to your doctor about whether you have any risk factors for sexually transmitted infections (STIs). Having one sex partner (who does not have STIs and does not have sex with anyone else) is a good way to avoid these infections. · Use birth control if you do not want to have children at this time. Talk with your doctor about the choices available and what might be best for you. · Protect your skin from too much sun. When you're outdoors from 10 a.m. to 4 p.m., stay in the shade or cover up with clothing and a hat with a wide brim. Wear sunglasses that block UV rays. Even when it's cloudy, put broad-spectrum sunscreen (SPF 30 or higher) on any exposed skin. · See a dentist one or two times a year for checkups and to have your teeth cleaned. · Wear a seat belt in the car. · Drink alcohol in moderation, if at all. That means no more than 2 drinks a day for men and 1 drink a day for women. Follow your doctor's advice about when to have certain tests. These tests can spot problems early. For everyone · Cholesterol. Have the fat (cholesterol) in your blood tested after age 21. Your doctor will tell you how often to have this done based on your age, family history, or other things that can increase your risk for heart disease. · Blood pressure. Have your blood pressure checked during a routine doctor visit. Your doctor will tell you how often to check your blood pressure based on your age, your blood pressure results, and other factors. · Vision. Talk with your doctor about how often to have a glaucoma test. 
· Diabetes. Ask your doctor whether you should have tests for diabetes. · Colon cancer. Have a test for colon cancer at age 48. You may have one of several tests. If you are younger than 48, you may need a test earlier if you have any risk factors. Risk factors include whether you already had a precancerous polyp removed from your colon or whether your parent, brother, sister, or child has had colon cancer. For women · Breast exam and mammogram. Talk to your doctor about when you should have a clinical breast exam and a mammogram. Medical experts differ on whether and how often women under 50 should have these tests. Your doctor can help you decide what is right for you. · Pap test and pelvic exam. Begin Pap tests at age 24. A Pap test is the best way to find cervical cancer. The test often is part of a pelvic exam. Ask how often to have this test. 
· Tests for sexually transmitted infections (STIs). Ask whether you should have tests for STIs. You may be at risk if you have sex with more than one person, especially if your partners do not wear condoms. For men · Tests for sexually transmitted infections (STIs). Ask whether you should have tests for STIs. You may be at risk if you have sex with more than one person, especially if you do not wear a condom. · Testicular cancer exam. Ask your doctor whether you should check your testicles regularly. · Prostate exam. Talk to your doctor about whether you should have a blood test (called a PSA test) for prostate cancer. Experts differ on whether and when men should have this test. Some experts suggest it if you are older than 39 and are -American or have a father or brother who got prostate cancer when he was younger than 72. When should you call for help? Watch closely for changes in your health, and be sure to contact your doctor if you have any problems or symptoms that concern you. Where can you learn more? Go to http://darin-bhargav.info/. Enter P072 in the search box to learn more about \"Well Visit, Ages 25 to 48: Care Instructions. \" Current as of: May 16, 2017 Content Version: 11.7 © 0938-8196 [employer]. Care instructions adapted under license by [employer] (which disclaims liability or warranty for this information).  If you have questions about a medical condition or this instruction, always ask your healthcare professional. [place] [address] any warranty or liability for your use of this information. Introducing [facility]! Girish Duffy introduces AppVault patient portal. Now you can access parts of your medical record, email your doctor's office, and request medication refills online. 1. In your internet browser, go to [email or web address] 2. Click on the First Time User? Click Here link in the Sign In box. You will see the New Member Sign Up page. 3. Enter your AppVault Access Code exactly as it appears below. You will not need to use this code after youve completed the sign-up process. If you do not sign up before the expiration date, you must request a new code. · AppVault Access Code: [email or web address] Expires: 10/24/2018 10:39 AM 
 
4. Enter the last four digits of your Social Security Number (xxxx) and Date of Birth (mm/dd/yyyy) as indicated and click Submit. You will be taken to the next sign-up page. 5. Create a AppVault ID. This will be your AppVault login ID and cannot be changed, so think of one that is secure and easy to remember. 6. Create a AppVault password. You can change your password at any time. 7. Enter your Password Reset Question and Answer. This can be used at a later time if you forget your password. 8. Enter your e-mail address. You will receive e-mail notification when new information is available in [address] 9. Click Sign Up. You can now view and download portions of your medical record. 10. Click the Download Summary menu link to download a portable copy of your medical information. If you have questions, please visit the Frequently Asked Questions section of the AppVault website. Remember, AppVault is NOT to be used for urgent needs. For medical emergencies, dial 911. Now available from your iPhone and Android! Please provide this summary of care documentation to your next provider. Your primary care clinician is listed as Dominick Hsieh. If you have any questions after today's visit, please call [phone].

## 2018-07-26 NOTE — PROGRESS NOTES
Subjective:   24 y.o. female for Well Woman Check. Her gyne and breast care is done elsewhere by her Ob-Gyne physician. Sees Dr Claire Catherine and is on OCPs for control of menstrual cycle. She attends [employer] and is majoring in music; plays piano and flute. 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 She had been seeing Dr Romayne Cedars with [address] [place] [address] Hematology but has aged out of that clinic; has seen Dr Zully Javier at [address] [place] [address] but has been having trouble getting through to that office and is interested in establishing with Hematology in the community. Current Outpatient Prescriptions   Medication Sig Dispense Refill    norgestrel-ethinyl estradiol (LOW-OGESTREL, 28,) 0.3-30 mg-mcg tab TAKE 1 TABLET BY MOUTH DAILY 3 Package 4    fexofenadine (ALLEGRA) 180 mg tablet Take  by mouth daily.  multivitamin (ONE A DAY) tablet Take 1 Tab by mouth daily. Allergies   Allergen Reactions    Milk Nausea and Vomiting    Other Medication Rash     Neosporin    Other Plant, Animal, Environmental Not Reported This Time     Cats and dogs     Past Medical History:   Diagnosis Date    Abnormal thyroid stimulating hormone level     Autism spectrum disorder     pervasive development disorder not otherwise specified, autism    Eczema     Optic nerve disorder     enlarged optic nerve, glaucoma suspect, Dr. Julio Cesar Stout [facility]     [facility]/g[facility]-July hematolgy- DR. ZAPIEN     Past Surgical History:   Procedure Laterality Date    HX HEENT      oral surgery; X2    HX WISDOM TEETH EXTRACTION       Family History   Problem Relation Age of Onset    Hypertension Mother     High Cholesterol Mother     Thyroid Disease Mother     Breast Cancer Mother 39    Cancer Mother      breast/thyroid    Diabetes Father     Hypertension Father     High Cholesterol Father     Thyroid Disease Father     Heart Disease Maternal Grandmother  Cancer Maternal Grandmother      colon cancer    Heart Disease Maternal Grandfather     Diabetes Maternal Grandfather     Heart Attack Maternal Grandfather     Diabetes Sister      Social History   Substance Use Topics    Smoking status: Never Smoker    Smokeless tobacco: Never Used    Alcohol use Yes      Comment: rarely             ROS: Feeling generally well. No TIA's or unusual headaches, no dysphagia. No prolonged cough. No dyspnea or chest pain on exertion. No abdominal pain, change in bowel habits, black or bloody stools. No urinary tract symptoms. No new or unusual musculoskeletal symptoms. Specific concerns today: none. Objective: The patient appears well, alert, oriented x 3, in no distress. Visit Vitals    /85 (BP 1 Location: Left arm, BP Patient Position: Sitting)    Pulse 81    Temp 98 °F (36.7 °C) (Oral)    Resp 12    Ht 5' 6\" (1.676 m)    Wt 162 lb 9.6 oz (73.8 kg)    LMP 06/20/2018 (Approximate)    SpO2 100%    BMI 26.24 kg/m2     Bp rechecked 124/82  ENT normal.  Neck supple. No adenopathy or thyromegaly. EMRE. Lungs are clear, good air entry, no wheezes, rhonchi or rales. S1 and S2 normal, no murmurs, regular rate and rhythm. Abdomen soft without tenderness, guarding, mass or organomegaly. Extremities show no edema, normal peripheral pulses. Neurological is normal, no focal findings. Breast and Pelvic exams are deferred. Assessment/Plan:   Well Woman  increase physical activity, routine labs ordered  Diagnoses and all orders for this visit:    1. Well woman exam (no gynecological exam)  -     CBC WITH AUTOMATED DIFF  -     LIPID PANEL  -     METABOLIC PANEL, COMPREHENSIVE  -     TSH 3RD GENERATION  -     URINALYSIS W/ RFLX MICROSCOPIC  -     HEMOGLOBIN A1C WITH EAG    2. Von Willebrand disease ([facility]  -     CBC WITH AUTOMATED DIFF  -     REFERRAL TO HEMATOLOGY ONCOLOGY    3.  Family history of diabetes mellitus (DM)  -     HEMOGLOBIN A1C WITH EAG    Gabriel Claude Khang Cornejo was counseled on age-appropriate/ guideline-based risk prevention behaviors and screening for a 24y.o. year old   female . We also discussed adjustments in screening based on family history if necessary. Printed instructions for preventative screening guidelines and healthy behaviors given to patient with after visit summary.         lab results and schedule of future lab studies reviewed with patient  reviewed diet, exercise and weight control  cardiovascular risk and specific lipid/LDL goals reviewed  reviewed medications and side effects in detail

## 2018-07-27 LAB
ALBUMIN SERPL-MCNC: 4.7 G/DL (ref 3.5–5.5)
ALBUMIN/GLOB SERPL: 1.5 {RATIO} (ref 1.2–2.2)
ALP SERPL-CCNC: 51 IU/L (ref 39–117)
ALT SERPL-CCNC: 10 IU/L (ref 0–32)
AST SERPL-CCNC: 17 IU/L (ref 0–40)
BASOPHILS # BLD AUTO: 0 X10E3/UL (ref 0–0.2)
BASOPHILS NFR BLD AUTO: 1 %
BILIRUB SERPL-MCNC: 0.3 MG/DL (ref 0–1.2)
BUN SERPL-MCNC: 7 MG/DL (ref 6–20)
BUN/CREAT SERPL: 10 (ref 9–23)
CALCIUM SERPL-MCNC: 9.7 MG/DL (ref 8.7–10.2)
CHLORIDE SERPL-SCNC: 103 MMOL/L (ref 96–106)
CHOLEST SERPL-MCNC: 145 MG/DL (ref 100–199)
CO2 SERPL-SCNC: 21 MMOL/L (ref 20–29)
CREAT SERPL-MCNC: 0.69 MG/DL (ref 0.57–1)
EOSINOPHIL # BLD AUTO: 0 X10E3/UL (ref 0–0.4)
EOSINOPHIL NFR BLD AUTO: 1 %
ERYTHROCYTE [DISTWIDTH] IN BLOOD BY AUTOMATED COUNT: 12.9 % (ref 12.3–15.4)
EST. AVERAGE GLUCOSE BLD GHB EST-MCNC: 97 MG/DL
GLOBULIN SER CALC-MCNC: 3.1 G/DL (ref 1.5–4.5)
GLUCOSE SERPL-MCNC: 81 MG/DL (ref 65–99)
HBA1C MFR BLD: 5 % (ref 4.8–5.6)
HCT VFR BLD AUTO: 37.2 % (ref 34–46.6)
HDLC SERPL-MCNC: 43 MG/DL
HGB BLD-MCNC: 12.5 G/DL (ref 11.1–15.9)
IMM GRANULOCYTES # BLD: 0 X10E3/UL (ref 0–0.1)
IMM GRANULOCYTES NFR BLD: 0 %
INTERPRETATION, 910389: NORMAL
LDLC SERPL CALC-MCNC: 94 MG/DL (ref 0–99)
LYMPHOCYTES # BLD AUTO: 1.8 X10E3/UL (ref 0.7–3.1)
LYMPHOCYTES NFR BLD AUTO: 39 %
MCH RBC QN AUTO: 32.1 PG (ref 26.6–33)
MCHC RBC AUTO-ENTMCNC: 33.6 G/DL (ref 31.5–35.7)
MCV RBC AUTO: 95 FL (ref 79–97)
MONOCYTES # BLD AUTO: 0.3 X10E3/UL (ref 0.1–0.9)
MONOCYTES NFR BLD AUTO: 7 %
NEUTROPHILS # BLD AUTO: 2.5 X10E3/UL (ref 1.4–7)
NEUTROPHILS NFR BLD AUTO: 52 %
PLATELET # BLD AUTO: 284 X10E3/UL (ref 150–379)
POTASSIUM SERPL-SCNC: 4.4 MMOL/L (ref 3.5–5.2)
PROT SERPL-MCNC: 7.8 G/DL (ref 6–8.5)
RBC # BLD AUTO: 3.9 X10E6/UL (ref 3.77–5.28)
SODIUM SERPL-SCNC: 141 MMOL/L (ref 134–144)
TRIGL SERPL-MCNC: 41 MG/DL (ref 0–149)
TSH SERPL DL<=0.005 MIU/L-ACNC: 0.7 UIU/ML (ref 0.45–4.5)
VLDLC SERPL CALC-MCNC: 8 MG/DL (ref 5–40)
WBC # BLD AUTO: 4.7 X10E3/UL (ref 3.4–10.8)

## 2018-09-28 ENCOUNTER — TELEPHONE (OUTPATIENT)
Dept: INTERNAL MEDICINE CLINIC | Age: 23
End: 2018-09-28

## 2018-09-28 NOTE — TELEPHONE ENCOUNTER
----- Message from Peg Higginbotham sent at 9/28/2018  3:58 PM EDT -----  Regarding: /Telephone  Juan Garsia (mother) is requesting a call back regarding pt receiving the wrong Meningitis Vaccine from [address] [place] [address].        Best [phone]

## 2018-10-01 NOTE — TELEPHONE ENCOUNTER
She should have received the Meningococcal B vaccine from the pharmacist as we had discussed at her visit; it should not be an issue in the long run but she should also get the Meningococcal B vaccine as well. I would check with the pharmacist to see how long they recommend waiting before receiving the Meningococcal B vaccine.

## 2018-10-01 NOTE — TELEPHONE ENCOUNTER
Patient received meningococcal A vaccine a week ago at [address] [place] [address] that was discussed at her Well Women appointment regarding going back to college, mother stated pt was suppose to received Meningococcal B vaccine instead, she was recently vaccinated in 2012 with meningococcal and mother wanted to know is this was too much of meningococcal A vaccine and what's going to happen in the long run?

## 2018-10-14 PROCEDURE — 94762 N-INVAS EAR/PLS OXIMTRY CONT: CPT

## 2018-10-14 PROCEDURE — 99284 EMERGENCY DEPT VISIT MOD MDM: CPT

## 2018-10-15 ENCOUNTER — HOSPITAL ENCOUNTER (EMERGENCY)
Age: 23
Discharge: HOME | End: 2018-10-15
Attending: EMERGENCY MEDICINE | Admitting: EMERGENCY MEDICINE
Payer: COMMERCIAL

## 2018-10-15 VITALS
HEIGHT: 66 IN | BODY MASS INDEX: 25.71 KG/M2 | HEART RATE: 92 BPM | DIASTOLIC BLOOD PRESSURE: 77 MMHG | TEMPERATURE: 98.1 F | SYSTOLIC BLOOD PRESSURE: 130 MMHG | RESPIRATION RATE: 12 BRPM | OXYGEN SATURATION: 98 % | WEIGHT: 160 LBS

## 2018-10-15 DIAGNOSIS — T78.40XA ALLERGIC REACTION, INITIAL ENCOUNTER: Primary | ICD-10-CM

## 2018-10-15 PROCEDURE — 96372 THER/PROPH/DIAG INJ SC/IM: CPT

## 2018-10-15 PROCEDURE — 96374 THER/PROPH/DIAG INJ IV PUSH: CPT

## 2018-10-15 PROCEDURE — 74011250636 HC RX REV CODE- 250/636: Performed by: EMERGENCY MEDICINE

## 2018-10-15 PROCEDURE — 96361 HYDRATE IV INFUSION ADD-ON: CPT

## 2018-10-15 PROCEDURE — 96375 TX/PRO/DX INJ NEW DRUG ADDON: CPT

## 2018-10-15 RX ORDER — EPINEPHRINE 1 MG/ML
0.3 INJECTION, SOLUTION, CONCENTRATE INTRAVENOUS
Status: COMPLETED | OUTPATIENT
Start: 2018-10-15 | End: 2018-10-15

## 2018-10-15 RX ORDER — EPINEPHRINE 0.3 MG/.3ML
0.3 INJECTION SUBCUTANEOUS
Qty: 2 SYRINGE | Refills: 0 | Status: SHIPPED | OUTPATIENT
Start: 2018-10-15 | End: 2018-10-15

## 2018-10-15 RX ORDER — DIPHENHYDRAMINE HCL 25 MG
50 CAPSULE ORAL
Qty: 100 CAP | Refills: 0 | Status: SHIPPED | OUTPATIENT
Start: 2018-10-15 | End: 2018-10-25

## 2018-10-15 RX ORDER — FAMOTIDINE 10 MG/ML
20 INJECTION INTRAVENOUS
Status: COMPLETED | OUTPATIENT
Start: 2018-10-15 | End: 2018-10-15

## 2018-10-15 RX ORDER — SODIUM CHLORIDE 0.9 % (FLUSH) 0.9 %
5-10 SYRINGE (ML) INJECTION AS NEEDED
Status: DISCONTINUED | OUTPATIENT
Start: 2018-10-15 | End: 2018-10-15 | Stop reason: HOSPADM

## 2018-10-15 RX ORDER — DIPHENHYDRAMINE HYDROCHLORIDE 50 MG/ML
50 INJECTION, SOLUTION INTRAMUSCULAR; INTRAVENOUS
Status: COMPLETED | OUTPATIENT
Start: 2018-10-15 | End: 2018-10-15

## 2018-10-15 RX ORDER — FAMOTIDINE 20 MG/1
20 TABLET, FILM COATED ORAL 2 TIMES DAILY
Qty: 20 TAB | Refills: 0 | Status: SHIPPED | OUTPATIENT
Start: 2018-10-15 | End: 2018-10-25

## 2018-10-15 RX ORDER — SODIUM CHLORIDE 0.9 % (FLUSH) 0.9 %
5-10 SYRINGE (ML) INJECTION EVERY 8 HOURS
Status: DISCONTINUED | OUTPATIENT
Start: 2018-10-15 | End: 2018-10-15 | Stop reason: HOSPADM

## 2018-10-15 RX ORDER — PREDNISONE 10 MG/1
TABLET ORAL
Qty: 21 TAB | Refills: 0 | Status: SHIPPED | OUTPATIENT
Start: 2018-10-15 | End: 2019-04-12

## 2018-10-15 RX ADMIN — DIPHENHYDRAMINE HYDROCHLORIDE 50 MG: 50 INJECTION, SOLUTION INTRAMUSCULAR; INTRAVENOUS at 00:26

## 2018-10-15 RX ADMIN — Medication 10 ML: at 00:48

## 2018-10-15 RX ADMIN — METHYLPREDNISOLONE SODIUM SUCCINATE 125 MG: 125 INJECTION, POWDER, FOR SOLUTION INTRAMUSCULAR; INTRAVENOUS at 00:29

## 2018-10-15 RX ADMIN — SODIUM CHLORIDE 1000 ML: 900 INJECTION, SOLUTION INTRAVENOUS at 00:48

## 2018-10-15 RX ADMIN — FAMOTIDINE 20 MG: 10 INJECTION, SOLUTION INTRAVENOUS at 00:43

## 2018-10-15 RX ADMIN — EPINEPHRINE 0.3 MG: 1 INJECTION, SOLUTION, CONCENTRATE INTRAVENOUS at 00:37

## 2018-10-15 NOTE — ED NOTES
Dr Tiarra Weber reviewed discharge instructions with the patient and parent. The patient and parent verbalized understanding.

## 2018-10-15 NOTE — LETTER
[address] 
[facility] EMERGENCY DEPT 
[address] [facility] [place] [address] [id number] 
[phone] Work/School Note Date: 10/14/2018 To Whom It May concern: 
 
Gretchen Camargo was seen and treated today in the emergency room by the following provider(s): 
Attending Provider: Anaya Payne DO. Gretchen Camargo may return to school on 10/16/2018. Sincerely, Anaya Payne DO

## 2018-10-15 NOTE — ED TRIAGE NOTES
Patient c/o episode of GI upset, diarrhea and after redness all over body. Patient states abd cramping and diarrhea gone but continues with redness.

## 2018-10-15 NOTE — DISCHARGE INSTRUCTIONS
We hope that we have addressed all of your medical concerns. The examination and treatment you received in the Emergency Department were for an emergent problem and were not intended as complete care. It is important that you follow up with your healthcare provider(s) for ongoing care. If your symptoms worsen or do not improve as expected, and you are unable to reach your usual health care provider(s), you should return to the Emergency Department. Today's healthcare is undergoing tremendous change, and patient satisfaction surveys are one of the many tools to assess the quality of medical care. You may receive a survey from the [employer] organization regarding your experience in the Emergency Department. I hope that your experience has been completely positive, particularly the medical care that I provided. As such, please participate in the survey; anything less than excellent does not meet my expectations or intentions. [address] [place] [address] participate in nationally recognized quality of care measures. If your blood pressure is greater than 120/80, as reported below, we urge that you seek medical care to address the potential of high blood pressure, commonly known as hypertension. Hypertension can be hereditary or can be caused by certain medical conditions, pain, stress, or \"white coat syndrome. \"       Please make an appointment with your health care provider(s) for follow up of your Emergency Department visit.        VITALS:   Patient Vitals for the past 8 hrs:   Temp Pulse Resp BP SpO2   10/15/18 0145 - 92 12 130/77 98 %   10/15/18 0130 - 89 13 127/72 98 %   10/15/18 0115 - 91 15 130/67 100 %   10/15/18 0100 - 97 14 138/65 99 %   10/15/18 0038 - (!) 113 15 (!) 144/92 100 %   10/15/18 0037 - (!) 113 - (!) 144/92 -   10/15/18 0001 98.1 °F (36.7 °C) 85 18 (!) 148/102 99 %          Thank you for allowing us to provide you with medical care today. We realize that you have many choices for your emergency care needs. Please choose us in the future for any continued health care needs. Afsaneh Eastmantiara [address] [facility] [address]: [phone]            No results found for this or any previous visit (from the past 24 hour(s)). No results found. Allergic Reaction: Care Instructions  Your Care Instructions    An allergic reaction is an excessive response from your immune system to a medicine, chemical, food, insect bite, or other substance. A reaction can range from mild to life-threatening. Some people have a mild rash, hives, and itching or stomach cramps. In severe reactions, swelling of your tongue and throat can close up your airway so that you cannot breathe. Follow-up care is a key part of your treatment and safety. Be sure to make and go to all appointments, and call your doctor if you are having problems. It's also a good idea to know your test results and keep a list of the medicines you take. How can you care for yourself at home? · If you know what caused your allergic reaction, be sure to avoid it. Your allergy may become more severe each time you have a reaction. · Take an over-the-counter antihistamine, such as cetirizine (Zyrtec) or loratadine (Claritin), to treat mild symptoms. Read and follow directions on the label. Some antihistamines can make you feel sleepy. Do not give antihistamines to a child unless you have checked with your doctor first. Mild symptoms include sneezing or an itchy or runny nose; an itchy mouth; a few hives or mild itching; and mild nausea or stomach discomfort. · Do not scratch hives or a rash. Put a cold, moist towel on them or take cool baths to relieve itching. Put ice packs on hives, swelling, or insect stings for 10 to 15 minutes at a time. Put a thin cloth between the ice pack and your skin. Do not take hot baths or showers.  They will make the itching worse. · Your doctor may prescribe a shot of epinephrine to carry with you in case you have a severe reaction. Learn how to give yourself the shot and keep it with you at all times. Make sure it is not . · Go to the emergency room every time you have a severe reaction, even if you have used your shot of epinephrine and are feeling better. Symptoms can come back after a shot. · Wear medical alert jewelry that lists your allergies. You can buy this at most [employer]. · If your child has a severe allergy, make sure that his or her teachers, babysitters, coaches, and other caregivers know about the allergy. They should have an epinephrine shot, know how and when to give it, and have a plan to take your child to the hospital.  When should you call for help? Give an epinephrine shot if:    · You think you are having a severe allergic reaction.     · You have symptoms in more than one body area, such as mild nausea and an itchy mouth.    After giving an epinephrine shot call 911, even if you feel better.   Call 911 if:    · You have symptoms of a severe allergic reaction. These may include:  ¨ Sudden raised, red areas (hives) all over your body. ¨ Swelling of the throat, mouth, lips, or tongue. ¨ Trouble breathing. ¨ Passing out (losing consciousness). Or you may feel very lightheaded or suddenly feel weak, confused, or restless.     · You have been given an epinephrine shot, even if you feel better.    Call your doctor now or seek immediate medical care if:    · You have symptoms of an allergic reaction, such as:  ¨ A rash or hives (raised, red areas on the skin). ¨ Itching. ¨ Swelling. ¨ Belly pain, nausea, or vomiting.    Watch closely for changes in your health, and be sure to contact your doctor if:    · You do not get better as expected. Where can you learn more? Go to http://darin-bhargav.info/.   Enter Y925 in the search box to learn more about \"Allergic Reaction: Care Instructions. \"  Current as of: June 28, 2018  Content Version: 11.8  © 7762-7774 Healthwise, [employer] Care instructions adapted under license by [employer] (which disclaims liability or warranty for this information). If you have questions about a medical condition or this instruction, always ask your healthcare professional. [place] [address] any warranty or liability for your use of this information.

## 2018-10-15 NOTE — ED PROVIDER NOTES
HPI Comments: This is a 65-year-old female who comes emergency room with chief complaint of rash and GI complaints. The patient states that she broke out in a rash approximately one hour prior to arrival. Patient states that her upper thighs have been itching. Patient has had no shortness of breath or difficulty swallowing. Patient states she did have some abdominal cramping just before the rash which led to an episode diarrhea. Patient currently denies any abdominal pain. Patient denies any chest pain or shortness of breath. Patient is a 24 y.o. female presenting with allergic reaction. The history is provided by the patient and a relative. No  was used. Allergic Reaction This is a new problem. The current episode started 1 to 2 hours ago. The problem has been gradually worsening. She ingested an unknown item. Pertinent negatives include no unusual behavior, no slurred speech, no walking unsteadily, no nausea, no vomiting, no depression, no suicidal ideas, no confusion and no shortness of breath. Past Medical History:  
Diagnosis Date  Abnormal thyroid stimulating hormone level  Autism spectrum disorder   
 pervasive development disorder not otherwise specified, autism  Eczema  Optic nerve disorder   
 enlarged optic nerve, glaucoma suspect, Dr. Joss Richardson  Von Willebrand disease ([facility] [address]   
 trace bales/gyn-July hematolgy- DR. ZAPIEN Past Surgical History:  
Procedure Laterality Date  HX HEENT    
 oral surgery; X2  
 HX WISDOM TEETH EXTRACTION Family History:  
Problem Relation Age of Onset  Hypertension Mother  High Cholesterol Mother  Thyroid Disease Mother  Breast Cancer Mother 39  Cancer Mother   
  breast/thyroid  Diabetes Father  Hypertension Father  High Cholesterol Father  Thyroid Disease Father  Heart Disease Maternal Grandmother  Cancer Maternal Grandmother   
  colon cancer  Heart Disease Maternal Grandfather  Diabetes Maternal Grandfather  Heart Attack Maternal Grandfather  Diabetes Sister Social History Social History  Marital status: SINGLE Spouse name: N/A  
 Number of children: N/A  
 Years of education: N/A Occupational History  Not on file. Social History Main Topics  Smoking status: Never Smoker  Smokeless tobacco: Never Used  Alcohol use Yes Comment: rarely  Drug use: No  
 Sexual activity: No  
 
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  
  walking a dog 1 mile ALLERGIES: Milk; Other medication; and Other plant, animal, environmental 
 
Review of Systems Constitutional: Negative for appetite change, chills, fever and unexpected weight change. HENT: Negative for ear pain, hearing loss, rhinorrhea and trouble swallowing. Eyes: Negative for pain and visual disturbance. Respiratory: Negative for cough, chest tightness and shortness of breath. Cardiovascular: Negative for chest pain and palpitations. Gastrointestinal: Positive for diarrhea. Negative for abdominal distention, abdominal pain, blood in stool, nausea and vomiting. Genitourinary: Negative for dysuria, hematuria and urgency. Musculoskeletal: Negative for back pain and myalgias. Skin: Positive for rash. Neurological: Negative for dizziness, syncope, weakness and numbness. Psychiatric/Behavioral: Negative for confusion, depression and suicidal ideas. All other systems reviewed and are negative. Vitals:  
 10/15/18 0100 10/15/18 0115 10/15/18 0130 10/15/18 0145 BP: 138/65 130/67 127/72 130/77 Pulse: 97 91 89 92 Resp: 14 15 13 12 Temp:      
SpO2: 99% 100% 98% 98% Weight:      
Height:      
      
 
Physical Exam  
 Constitutional: She is oriented to person, place, and time. She appears well-developed and well-nourished. No distress. HENT:  
Head: Normocephalic and atraumatic. Right Ear: Hearing and external ear normal.  
Left Ear: Hearing and external ear normal.  
Nose: Nose normal.  
Mouth/Throat: Uvula is midline and oropharynx is clear and moist. No uvula swelling. No oropharyngeal exudate. Eyes: Conjunctivae and EOM are normal. Pupils are equal, round, and reactive to light. Right eye exhibits no discharge. Left eye exhibits no discharge. No scleral icterus. Neck: Normal range of motion. Neck supple. No JVD present. No tracheal deviation present. Cardiovascular: Normal rate, regular rhythm, normal heart sounds and intact distal pulses. Exam reveals no gallop and no friction rub. No murmur heard. Pulmonary/Chest: Effort normal and breath sounds normal. No stridor. No respiratory distress. She has no decreased breath sounds. She has no wheezes. She has no rhonchi. She has no rales. She exhibits no tenderness. Abdominal: Soft. Bowel sounds are normal. She exhibits no distension. There is no tenderness. There is no rebound and no guarding. Musculoskeletal: Normal range of motion. She exhibits no edema or tenderness. Neurological: She is alert and oriented to person, place, and time. She has normal strength and normal reflexes. No cranial nerve deficit or sensory deficit. She exhibits normal muscle tone. Coordination normal. GCS eye subscore is 4. GCS verbal subscore is 5. GCS motor subscore is 6. Skin: Skin is warm and dry. Rash noted. Rash is urticarial. She is not diaphoretic. No erythema. No pallor. Diffuse urticarial rash on truck, back, bilat upper extremities, upper part of bilat lower extremities. Redness to face. Psychiatric: She has a normal mood and affect.  Her speech is normal and behavior is normal. Judgment and thought content normal. Cognition and memory are normal.  
 Nursing note and vitals reviewed. MDM Number of Diagnoses or Management Options Allergic reaction, initial encounter:  
  
Amount and/or Complexity of Data Reviewed Clinical lab tests: ordered and reviewed Tests in the radiology section of CPT®: ordered and reviewed Tests in the medicine section of CPT®: ordered and reviewed Independent visualization of images, tracings, or specimens: yes (ekg) Risk of Complications, Morbidity, and/or Mortality Presenting problems: moderate Diagnostic procedures: moderate Management options: moderate Critical Care Total time providing critical care: 30-74 minutes (Total critical care time spent exclusive of procedures:  40 minutes) Patient Progress Patient progress: stable ED Course Procedures Chief Complaint Patient presents with  GI Problem  Allergic Reaction The patient's presenting problems have been discussed, and they are in agreement with the care plan formulated and outlined with them. I have encouraged them to ask questions as they arise throughout their visit. MEDICATIONS GIVEN: 
Medications  
sodium chloride (NS) flush 5-10 mL (10 mL IntraVENous Given 10/15/18 0048) sodium chloride (NS) flush 5-10 mL (not administered) methylPREDNISolone (PF) (SOLU-MEDROL) injection 125 mg (125 mg IntraVENous Given 10/15/18 0029)  
famotidine (PF) (PEPCID) injection 20 mg (20 mg IntraVENous Given 10/15/18 0043) diphenhydrAMINE (BENADRYL) injection 50 mg (50 mg IntraVENous Given 10/15/18 0026)  
sodium chloride 0.9 % bolus infusion 1,000 mL (0 mL IntraVENous IV Completed 10/15/18 0207) EPINEPHrine HCl (PF) (ADRENALIN) 1 mg/mL (1 mL) injection 0.3 mg (0.3 mg IntraMUSCular Given 10/15/18 0037) LABS REVIEWED: 
No results found for this or any previous visit (from the past 24 hour(s)). VITAL SIGNS: 
Patient Vitals for the past 12 hrs: 
 Temp Pulse Resp BP SpO2  
10/15/18 0145 - 92 12 130/77 98 % 10/15/18 0130 - 89 13 127/72 98 % 10/15/18 0115 - 91 15 130/67 100 % 10/15/18 0100 - 97 14 138/65 99 % 10/15/18 0038 - (!) 113 15 (!) 144/92 100 % 10/15/18 0037 - (!) 113 - (!) 144/92 -  
10/15/18 0001 98.1 °F (36.7 °C) 85 18 (!) 148/102 99 % RADIOLOGY RESULTS: 
The following have been ordered and reviewed: 
No results found. PROGRESS NOTES: 
Pt significantly improved. Rash gone. Discussed results and plan with patient and family. Patient will be discharged home with PCP and allergist follow up. Patient instructed to return to the emergency room for any worsening symptoms or any other concerns. DIAGNOSIS: 
 
1. Allergic reaction, initial encounter PLAN: 
Follow-up Information Follow up With Details Comments Contact Info Miranda Blanc MD Schedule an appointment as soon as possible for a visit  [address] Internal Medicine [address] 
[phone] Esme Gurrola MD Schedule an appointment as soon as possible for a visit  [address] [id number] [facility] [place] [id number] [phone] [facility] EMERGENCY DEPT  If symptoms worsen [address] [facility] [facility] 
[phone] Discharge Medication List as of 10/15/2018  2:32 AM  
  
START taking these medications Details EPINEPHrine (EPIPEN) 0.3 mg/0.3 mL injection 0.3 mL by IntraMUSCular route once as needed for up to 1 dose., Print, Disp-2 Syringe, R-0  
  
predniSONE (STERAPRED DS) 10 mg dose pack Take as directed. , Print, Disp-21 Tab, R-0  
  
diphenhydrAMINE (BENADRYL) 25 mg capsule Take 2 Caps by mouth every six (6) hours as needed for up to 10 days. , Print, Disp-100 Cap, R-0  
  
famotidine (PEPCID) 20 mg tablet Take 1 Tab by mouth two (2) times a day for 10 days. , Print, Disp-20 Tab, R-0  
  
  
CONTINUE these medications which have NOT CHANGED  Details  
norgestrel-ethinyl estradiol (LOW-OGESTREL, 28,) 0.3-30 mg-mcg tab TAKE 1 TABLET BY MOUTH DAILY, Normal, Disp-3 Package, R-4  
  
fexofenadine (ALLEGRA) 180 mg tablet Take  by mouth daily. , Historical Med  
  
multivitamin (ONE A DAY) tablet Take 1 Tab by mouth daily. , Historical Med  
  
  
 
 
ED COURSE: The patient's hospital course has been uncomplicated.

## 2018-11-12 ENCOUNTER — TELEPHONE (OUTPATIENT)
Dept: INTERNAL MEDICINE CLINIC | Age: 23
End: 2018-11-12

## 2018-11-12 NOTE — TELEPHONE ENCOUNTER
FYI: Patient's mother called to schedule a sick visit apt for her daughter. States she's been sick for the past 10 days and needs to be seen by either Dr Blessing Ragsdale or Shelli Abreu this week. Patient's mother declined the available apts with Shelli Abreu for this week and declined to wait for Blessing Ragsdale, Mother went on to complain about Dr Kari Khan schedule and the fact that she's not a full time doctor with hours that are flexible for college students, states we are being dishonest to patients. Mother continued to complain and was being difficult when I was asking her certain questions to help better schedule her daughter ie: what time does class start for your daughter etc.     Placed mother on hold to check other providers schedule , mother hung up while holding. Was going to offer a 7:45 am with Dr Shaye Suero but mother disconnected the call.

## 2019-04-12 ENCOUNTER — OFFICE VISIT (OUTPATIENT)
Dept: OBGYN CLINIC | Age: 24
End: 2019-04-12

## 2019-04-12 VITALS
SYSTOLIC BLOOD PRESSURE: 120 MMHG | HEIGHT: 66 IN | DIASTOLIC BLOOD PRESSURE: 68 MMHG | BODY MASS INDEX: 24.11 KG/M2 | WEIGHT: 150 LBS

## 2019-04-12 DIAGNOSIS — Z01.419 ENCOUNTER FOR GYNECOLOGICAL EXAMINATION WITHOUT ABNORMAL FINDING: Primary | ICD-10-CM

## 2019-04-12 DIAGNOSIS — D68.00 VON WILLEBRAND DISEASE: ICD-10-CM

## 2019-04-12 NOTE — PROGRESS NOTES
Cyn Burr is a ,  24 y.o. female [facility] whose Patient's last menstrual period was 2019 (approximate). who presents for her annual checkup. She is having no significant problems. With regard to the Gardisil vaccine, she declines to receive it. Menstrual status:    Her periods are moderate in flow. She is using three to five pads or tampons per day, usually regular every 26-30 days. She denies dysmenorrhea. She reports no premenstrual symptoms. Contraception:    The current method of family planning is abstinence and She declines contraception and counseling. Sexual history:    She  reports that she does not engage in sexual activity. Medical conditions:    Since her last annual GYN exam about 3/27/2018 ago, she has not the following changes in her health history: none. Pap and Mammogram History:    She has never had a pap smear, declined last year, never been sexually active. The patient has never had a mammogram.    The patient does not have a family history of breast cancer. Past Medical History:   Diagnosis Date    Abnormal thyroid stimulating hormone level     Autism spectrum disorder     pervasive development disorder not otherwise specified, autism    Eczema     Optic nerve disorder     enlarged optic nerve, glaucoma suspect, Dr. Winston Sow disease ([facility] [address]     trace bales/gyn-July hematolgy- DR. ZAPIEN     Past Surgical History:   Procedure Laterality Date    HX HEENT      oral surgery; X2    HX WISDOM TEETH EXTRACTION         Current Outpatient Medications   Medication Sig Dispense Refill    norgestrel-ethinyl estradiol (LOW-OGESTREL, 28,) 0.3-30 mg-mcg tab TAKE 1 TABLET BY MOUTH DAILY 3 Package 4    fexofenadine (ALLEGRA) 180 mg tablet Take  by mouth daily.  multivitamin (ONE A DAY) tablet Take 1 Tab by mouth daily.  predniSONE (STERAPRED DS) 10 mg dose pack Take as directed. 21 Tab 0     Allergies: Milk;  Other medication; and Other plant, animal, environmental   Social History     Socioeconomic History    Marital status: SINGLE     Spouse name: Not on file    Number of children: Not on file    Years of education: Not on file    Highest education level: Not on file   Occupational History    Not on file   Social Needs    Financial resource strain: Not on file    Food insecurity:     Worry: Not on file     Inability: Not on file    Transportation needs:     Medical: Not on file     Non-medical: Not on file   Tobacco Use    Smoking status: Never Smoker    Smokeless tobacco: Never Used   Substance and Sexual Activity    Alcohol use: Yes     Comment: rarely    Drug use: No    Sexual activity: Never   Lifestyle    Physical activity:     Days per week: Not on file     Minutes per session: Not on file    Stress: Not on file   Relationships    Social connections:     Talks on phone: Not on file     Gets together: Not on file     Attends Latter-day service: Not on file     Active member of club or organization: Not on file     Attends meetings of clubs or organizations: Not on file     Relationship status: Not on file    Intimate partner violence:     Fear of current or ex partner: Not on file     Emotionally abused: Not on file     Physically abused: Not on file     Forced sexual activity: Not on file   Other Topics Concern    Not on file   Social History Narrative    Full time student at [employer], music education            Manageable        Sleeps 7 hours/night        Exercise     walking a dog 1 mile     Tobacco History:  reports that she has never smoked. She has never used smokeless tobacco.  Alcohol Abuse:  reports that she drinks alcohol. Drug Abuse:  reports that she does not use drugs.     Patient Active Problem List   Diagnosis Code    [facility] D68.0       Review of Systems - History obtained from the patient  Constitutional: negative for weight loss, fever, night sweats  HEENT: negative for hearing loss, earache, congestion, snoring, sorethroat  CV: negative for chest pain, palpitations, edema  Resp: negative for cough, shortness of breath, wheezing  GI: negative for change in bowel habits, abdominal pain, black or bloody stools  : negative for frequency, dysuria, hematuria, vaginal discharge  MSK: negative for back pain, joint pain, muscle pain  Breast: negative for breast lumps, nipple discharge, galactorrhea  Skin :negative for itching, rash, hives  Neuro: negative for dizziness, headache, confusion, weakness  Psych: negative for anxiety, depression, change in mood  Heme/lymph: negative for bleeding, bruising, pallor    Physical Exam    Visit Vitals  /68   Ht 5' 6\" (1.676 m)   Wt 150 lb (68 kg)   LMP 04/08/2019 (Approximate)   BMI 24.21 kg/m²       Constitutional  · Appearance: well-nourished, well developed, alert, in no acute distress    HENT  · Head and Face: appears normal    Neck  · Inspection/Palpation: normal appearance, no masses or tenderness  · Lymph Nodes: no lymphadenopathy present  · Thyroid: gland size normal, nontender, no nodules or masses present on palpation    Chest  · Respiratory Effort: breathing normal  · Auscultation: normal breath sounds    Cardiovascular  · Heart:  · Auscultation: regular rate and rhythm without murmur    Breasts  · Inspection of Breasts: breasts symmetrical, no skin changes, no discharge present, nipple appearance normal, no skin retraction present  · Palpation of Breasts and Axillae: no masses present on palpation, no breast tenderness  · Axillary Lymph Nodes: no lymphadenopathy present    Gastrointestinal  · Abdominal Examination: abdomen non-tender to palpation, normal bowel sounds, no masses present  · Liver and spleen: no hepatomegaly present, spleen not palpable  · Hernias: no hernias identified    ·     Skin  · General Inspection: no rash, no lesions identified    Neurologic/Psychiatric  · Mental Status:  · Orientation: grossly oriented to person, place and time  · Mood and Affect: mood normal, affect appropriate    .   Assessment:  Routine gynecologic examination  Von Willebrands disease  Never SA    Plan:  Counseled re: diet, exercise, healthy lifestyle  Return for yearly wellness visits  Declines pelvic exam  Declines gardisil - disc importance with patient

## 2019-04-12 NOTE — PATIENT INSTRUCTIONS

## 2019-07-29 ENCOUNTER — OFFICE VISIT (OUTPATIENT)
Dept: INTERNAL MEDICINE CLINIC | Age: 24
End: 2019-07-29

## 2019-07-29 VITALS
RESPIRATION RATE: 18 BRPM | OXYGEN SATURATION: 99 % | WEIGHT: 158.4 LBS | SYSTOLIC BLOOD PRESSURE: 129 MMHG | HEART RATE: 93 BPM | BODY MASS INDEX: 25.46 KG/M2 | DIASTOLIC BLOOD PRESSURE: 88 MMHG | HEIGHT: 66 IN | TEMPERATURE: 98.1 F

## 2019-07-29 DIAGNOSIS — Z00.00 WELL WOMAN EXAM (NO GYNECOLOGICAL EXAM): Primary | ICD-10-CM

## 2019-07-29 DIAGNOSIS — J30.9 ALLERGIC RHINITIS, UNSPECIFIED SEASONALITY, UNSPECIFIED TRIGGER: ICD-10-CM

## 2019-07-29 DIAGNOSIS — D68.00 VON WILLEBRAND DISEASE: ICD-10-CM

## 2019-07-29 RX ORDER — EPINEPHRINE 0.3 MG/.3ML
0.3 INJECTION SUBCUTANEOUS
COMMUNITY

## 2019-07-29 NOTE — PATIENT INSTRUCTIONS
Well Visit, Ages 25 to 48: Care Instructions  Your Care Instructions    Physical exams can help you stay healthy. Your doctor has checked your overall health and may have suggested ways to take good care of yourself. He or she also may have recommended tests. At home, you can help prevent illness with healthy eating, regular exercise, and other steps. Follow-up care is a key part of your treatment and safety. Be sure to make and go to all appointments, and call your doctor if you are having problems. It's also a good idea to know your test results and keep a list of the medicines you take. How can you care for yourself at home? · Reach and stay at a healthy weight. This will lower your risk for many problems, such as obesity, diabetes, heart disease, and high blood pressure. · Get at least 30 minutes of physical activity on most days of the week. Walking is a good choice. You also may want to do other activities, such as running, swimming, cycling, or playing tennis or team sports. Discuss any changes in your exercise program with your doctor. · Do not smoke or allow others to smoke around you. If you need help quitting, talk to your doctor about stop-smoking programs and medicines. These can increase your chances of quitting for good. · Talk to your doctor about whether you have any risk factors for sexually transmitted infections (STIs). Having one sex partner (who does not have STIs and does not have sex with anyone else) is a good way to avoid these infections. · Use birth control if you do not want to have children at this time. Talk with your doctor about the choices available and what might be best for you. · Protect your skin from too much sun. When you're outdoors from 10 a.m. to 4 p.m., stay in the shade or cover up with clothing and a hat with a wide brim. Wear sunglasses that block UV rays. Even when it's cloudy, put broad-spectrum sunscreen (SPF 30 or higher) on any exposed skin.   · See a dentist one or two times a year for checkups and to have your teeth cleaned. · Wear a seat belt in the car. Follow your doctor's advice about when to have certain tests. These tests can spot problems early. For everyone  · Cholesterol. Have the fat (cholesterol) in your blood tested after age 21. Your doctor will tell you how often to have this done based on your age, family history, or other things that can increase your risk for heart disease. · Blood pressure. Have your blood pressure checked during a routine doctor visit. Your doctor will tell you how often to check your blood pressure based on your age, your blood pressure results, and other factors. · Vision. Talk with your doctor about how often to have a glaucoma test.  · Diabetes. Ask your doctor whether you should have tests for diabetes. · Colon cancer. Your risk for colorectal cancer gets higher as you get older. Some experts say that adults should start regular screening at age 48 and stop at age 76. Others say to start before age 48 or continue after age 76. Talk with your doctor about your risk and when to start and stop screening. For women  · Breast exam and mammogram. Talk to your doctor about when you should have a clinical breast exam and a mammogram. Medical experts differ on whether and how often women under 50 should have these tests. Your doctor can help you decide what is right for you. · Cervical cancer screening test and pelvic exam. Begin with a Pap test at age 24. The test often is part of a pelvic exam. Starting at age 27, you may choose to have a Pap test, an HPV test, or both tests at the same time (called co-testing). Talk with your doctor about how often to have testing. · Tests for sexually transmitted infections (STIs). Ask whether you should have tests for STIs. You may be at risk if you have sex with more than one person, especially if your partners do not wear condoms.   For men  · Tests for sexually transmitted infections (STIs). Ask whether you should have tests for STIs. You may be at risk if you have sex with more than one person, especially if you do not wear a condom. · Testicular cancer exam. Ask your doctor whether you should check your testicles regularly. · Prostate exam. Talk to your doctor about whether you should have a blood test (called a PSA test) for prostate cancer. Experts differ on whether and when men should have this test. Some experts suggest it if you are older than 39 and are -American or have a father or brother who got prostate cancer when he was younger than 72. When should you call for help? Watch closely for changes in your health, and be sure to contact your doctor if you have any problems or symptoms that concern you. Where can you learn more? Go to http://darin-bhargav.info/. Enter P072 in the search box to learn more about \"Well Visit, Ages 25 to 48: Care Instructions. \"  Current as of: December 13, 2018  Content Version: 12.1  © 6929-8838 [employer]. Care instructions adapted under license by [employer] (which disclaims liability or warranty for this information). If you have questions about a medical condition or this instruction, always ask your healthcare professional. [place] [address] any warranty or liability for your use of this information. Serogroup B Meningococcal Vaccine (MenB): What You Need to Know  Why get vaccinated? Meningococcal disease is a serious illness caused by a type of bacteria called Neisseria meningitidis. It can lead to meningitis (infection of the lining of the brain and spinal cord) and infections of the blood. Meningococcal disease often occurs without warning  even among people who are otherwise healthy.   Meningococcal disease can spread from person to person through close contact (coughing or kissing) or lengthy contact, especially among people living in the same household. There are at least 12 types of N. meningitidis, called \"serogroups. \" Serogroups A, B, C, W, and Y cause most meningococcal disease. Anyone can get meningococcal disease. But certain people are at increased risk, including:  · Infants younger than one year old  · Adolescents and young adults 12 through 21years old  · People with certain medical conditions that affect the immune system  · Microbiologists who routinely work with isolates of N. meningitidis  · People at risk because of an outbreak in their community  Even when it is treated, meningococcal disease kills 10 to 15 infected people out of 100. And of those who survive, about 10 to 20 out of every 100 will suffer disabilities such as hearing loss, brain damage, kidney damage, amputations, nervous system problems, or severe scars from skin grafts. Serogroup B meningococcal (MenB) vaccine can help prevent meningococcal disease caused by serogroup B. Other meningococcal vaccines are recommended to help protect against serogroups A, C, W, and Y. Serogroup B Meningococcal Vaccines  Two serogroup B meningococcal vaccines  Bexsero® and Trumenba®  have been licensed by the [employer] and Drug Administration (FDA). These vaccines are recommended routinely for people 10 years or older who are at increased risk for serogroup B meningococcal infections, including:  · People at risk because of a serogroup B meningococcal disease outbreak  · Anyone whose spleen is damaged or has been removed  · Anyone with a rare immune system condition called \"persistent complement component deficiency\"  · Anyone taking a drug called eculizumab (also called Soliris®)  · Microbiologists who routinely work with isolates of N. meningitidis  These vaccines may also be given to anyone 12 through 21years old to provide short term protection against most strains of serogroup B meningococcal disease; 16 through 18 years are the preferred ages for vaccination.   For best protection, more than 1 dose of a serogroup B meningococcal vaccine is needed. The same vaccine must be used for all doses. Ask your health care provider about the number and timing of doses. Some people should not get these vaccines  Tell the person who is giving you the vaccine:  · If you have any severe, life-threatening allergies. If you have ever had a life-threatening allergic reaction after a previous dose of serogroup B meningococcal vaccine, or if you have a severe allergy to any part of this vaccine, you should not get the vaccine. Tell your health care provider if you have any severe allergies that you know of, including a severe allergy to latex. He or she can tell you about the vaccine's ingredients. · If you are pregnant or breastfeeding. There is not very much information about the potential risks of this vaccine for a pregnant woman or breastfeeding mother. It should be used during pregnancy only if clearly needed. If you have a mild illness, such as a cold, you can probably get the vaccine today. If you are moderately or severely ill, you should probably wait until you recover. Your doctor can advise you. Risks of a vaccine reaction  With any medicine, including vaccines, there is a chance of reactions. These are usually mild and go away on their own within a few days, but serious reactions are also possible. More than half of the people who get serogroup B meningococcal vaccine have mild problems following vaccination. These reactions can last up to 3 to 7 days, and include:  · Soreness, redness, or swelling where the shot was given  · Tiredness or fatigue  · Headache  · Muscle or joint pain  · Fever or chills  · Nausea or diarrhea  Other problems that could happen after these vaccines:  · People sometimes faint after a medical procedure, including vaccination. Sitting or lying down for about 15 minutes can help prevent fainting and injuries caused by a fall.  Tell your provider if you feel dizzy, or have vision changes or ringing in the ears. · Some people get shoulder pain that can be more severe and longer-lasting than the more routine soreness that can follow injections. This happens very rarely. · Any medication can cause a severe allergic reaction. Such reactions from a vaccine are very rare, estimated at about 1 in a million doses, and would happen within a few minutes to a few hours after the vaccination. As with any medicine, there is a very remote chance of a vaccine causing a serious injury or death. The safety of vaccines is always being monitored. For more information, visit the vaccine safety web site: www.cdc.gov/vaccinesafety. What if there is a serious reaction? What should I look for? · Look for anything that concerns you, such as signs of a severe allergic reaction, very high fever, or unusual behavior. Signs of a severe allergic reaction can include hives, swelling of the face and throat, difficulty breathing, a fast heartbeat, dizziness, and weakness. These would usually start a few minutes to a few hours after the vaccination. What should I do? · If you think it is a severe allergic reaction or other emergency that can't wait, call 911 and get to the nearest hospital. Otherwise, call your clinic. Afterward, the reaction should be reported to the Vaccine Adverse Event Reporting System (VAERS). Your doctor should file this report, or you can do it yourself through the VAERS website at www.vaers. hhs.gov, or by calling 2-154.190.3692. VAERS does not give medical advice. The National Vaccine Injury Compensation Program  The National Vaccine Injury Compensation Program (VICP) is a federal program that was created to compensate people who may have been injured by certain vaccines. Persons who believe they may have been injured by a vaccine can learn about the program and about filing a claim by calling 1-237.885.8376 or visiting the [employer] website at www.[facility].gov/vaccinecompensation.  There is a time limit to file a claim for compensation. How can I learn more? · Ask your health care provider. He or she can give you the vaccine package insert or suggest other sources of information. · Call your local or state health department. · Contact the Centers for Disease Control and Prevention (CDC):  ? Call 7-629.242.1744 (1-800-CDC-INFO) or  ? Visit CDC's vaccines website at www.cdc.gov/vaccines  Vaccine Information Statement  Serogroup B Meningococcal Vaccine  8-  [address] [id number]-52  Department of Health and Human Services  Centers for Disease Control and Prevention  Many Vaccine Information Statements are available in [place] and other languages. See www.immunize.org/vis. Hojas de información sobre vacunas están disponibles en español y en muchos otros idiomas. Visite www.immunize.org/vis. Care instructions adapted under license by [employer] (which disclaims liability or warranty for this information). If you have questions about a medical condition or this instruction, always ask your healthcare professional. [place] [address] any warranty or liability for your use of this information.

## 2019-07-29 NOTE — PROGRESS NOTES
Subjective:   21 y.o. female for Well Woman Check. Her gyne and breast care is done elsewhere by her Ob-Gyne physician. Sees Dr Jose Espinoza; on OCPs for regulation of menstrual cycle. 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 She is requesting a printed prescription for meningitis B to take to her pharmacy and will be given there. Has recently been to see Dr. Jessica Moon for allergy testing to foods and environmental allergies; she had an allergic reaction in October 2018 where she was seen in ED and given prescription for an EpiPen. Reports her allergy testing reveals that she is allergic to cats but not dogs; questionable milk allergy. She is considering starting allergy injections.       Diagnosed with [place] disease when she first started her menstrual cycle and was diagnosed when family lived in [place]. She had been seeing Dr Wendy Leblanc with [address] [place] [address] Hematology but has aged out of that clinic; has seen Dr Jorge A Berman at [address] [place] [address] and has an appointment scheduled for December 2019 as she is now considered a new patient again. Denies any excessive bleeding, bruising. Current Outpatient Medications   Medication Sig Dispense Refill    EPINEPHrine (EPIPEN) 0.3 mg/0.3 mL injection 0.3 mg by IntraMUSCular route once as needed.  meningococcal B,4-CMP PF vaccine (BEXSERO) 50-50-50-25 mcg/0.5 mL injection 0.5 mL by IntraMUSCular route once for 1 dose. 0.5 mL 0    norgestrel-ethinyl estradiol (LOW-OGESTREL, 28,) 0.3-30 mg-mcg tab TAKE 1 TABLET BY MOUTH DAILY 3 Package 4    fexofenadine (ALLEGRA) 180 mg tablet Take  by mouth daily.  multivitamin (ONE A DAY) tablet Take 1 Tab by mouth daily.        Allergies   Allergen Reactions    Milk Nausea and Vomiting    Other Medication Rash     Neosporin    Other Plant, Animal, Environmental Not Reported This Time     Cats     Past Medical History:   Diagnosis Date    Abnormal thyroid stimulating hormone level     Autism spectrum disorder     pervasive development disorder not otherwise specified, autism    Eczema     Optic nerve disorder     enlarged optic nerve, glaucoma suspect, Dr. Sima Amos disease ([facility] [address]     trace bales/gyn-July hematolgy- DR. ZAPIEN     Past Surgical History:   Procedure Laterality Date    HX HEENT      oral surgery; X2    HX WISDOM TEETH EXTRACTION       Family History   Problem Relation Age of Onset    Hypertension Mother     High Cholesterol Mother     Thyroid Disease Mother     Breast Cancer Mother 39    Cancer Mother         breast/thyroid    Diabetes Father     Hypertension Father     High Cholesterol Father     Thyroid Disease Father     No Known Problems Brother     Heart Disease Maternal Grandmother     Cancer Maternal Grandmother         colon cancer    Heart Disease Maternal Grandfather     Diabetes Maternal Grandfather     Heart Attack Maternal Grandfather     Diabetes Sister     No Known Problems Brother      Social History     Tobacco Use    Smoking status: Never Smoker    Smokeless tobacco: Never Used   Substance Use Topics    Alcohol use: Yes     Comment: rarely             ROS: Feeling generally well. No TIA's or unusual headaches, no dysphagia. No prolonged cough. No dyspnea or chest pain on exertion. No abdominal pain, change in bowel habits, black or bloody stools. No urinary tract symptoms. No new or unusual musculoskeletal symptoms. Specific concerns today: None. Objective: The patient appears well, alert, oriented x 3, in no distress. Visit Vitals  /88 (BP 1 Location: Left arm, BP Patient Position: Sitting)   Pulse 93   Temp 98.1 °F (36.7 °C) (Oral)   Resp 18   Ht 5' 6\" (1.676 m)   Wt 158 lb 6.4 oz (71.8 kg)   SpO2 99%   BMI 25.57 kg/m²     ENT normal.  Neck supple. No adenopathy or thyromegaly. EMRE. Lungs are clear, good air entry, no wheezes, rhonchi or rales.  S1 and S2 normal, no murmurs, regular rate and rhythm. Abdomen soft without tenderness, guarding, mass or organomegaly. Extremities show no edema, normal peripheral pulses. Neurological is normal, no focal findings. Breast and Pelvic exams are deferred. Assessment/Plan:   Well Woman  continue present plan, routine labs ordered  Diagnoses and all orders for this visit:    1. Well woman exam (no gynecological exam)  -     CBC WITH AUTOMATED DIFF  -     METABOLIC PANEL, COMPREHENSIVE  -     TSH 3RD GENERATION  -     meningococcal B,4-CMP PF vaccine (BEXSERO) 50-50-50-25 mcg/0.5 mL injection; 0.5 mL by IntraMUSCular route once for 1 dose. 2. Allergic rhinitis, unspecified seasonality, unspecified trigger --undergoing evaluation currently with allergist.    3. Tania Campi disease ([facility] [address] --will be followed by hematology at [facility] was counseled on age-appropriate/ guideline-based risk prevention behaviors and screening for a 21y.o. year old   female . We also discussed adjustments in screening based on family history if necessary. Printed instructions for preventative screening guidelines and healthy behaviors given to patient with after visit summary.     lab results and schedule of future lab studies reviewed with patient  reviewed diet, exercise and weight control  reviewed medications and side effects in detail

## 2019-07-30 LAB
ALBUMIN SERPL-MCNC: 4.8 G/DL (ref 3.5–5.5)
ALBUMIN/GLOB SERPL: 1.8 {RATIO} (ref 1.2–2.2)
ALP SERPL-CCNC: 53 IU/L (ref 39–117)
ALT SERPL-CCNC: 12 IU/L (ref 0–32)
AST SERPL-CCNC: 18 IU/L (ref 0–40)
BASOPHILS # BLD AUTO: 0 X10E3/UL (ref 0–0.2)
BASOPHILS NFR BLD AUTO: 1 %
BILIRUB SERPL-MCNC: 0.4 MG/DL (ref 0–1.2)
BUN SERPL-MCNC: 7 MG/DL (ref 6–20)
BUN/CREAT SERPL: 10 (ref 9–23)
CALCIUM SERPL-MCNC: 9.8 MG/DL (ref 8.7–10.2)
CHLORIDE SERPL-SCNC: 105 MMOL/L (ref 96–106)
CO2 SERPL-SCNC: 24 MMOL/L (ref 20–29)
CREAT SERPL-MCNC: 0.71 MG/DL (ref 0.57–1)
EOSINOPHIL # BLD AUTO: 0.1 X10E3/UL (ref 0–0.4)
EOSINOPHIL NFR BLD AUTO: 3 %
ERYTHROCYTE [DISTWIDTH] IN BLOOD BY AUTOMATED COUNT: 13.1 % (ref 12.3–15.4)
GLOBULIN SER CALC-MCNC: 2.7 G/DL (ref 1.5–4.5)
GLUCOSE SERPL-MCNC: 71 MG/DL (ref 65–99)
HCT VFR BLD AUTO: 38.4 % (ref 34–46.6)
HGB BLD-MCNC: 12.9 G/DL (ref 11.1–15.9)
IMM GRANULOCYTES # BLD AUTO: 0 X10E3/UL (ref 0–0.1)
IMM GRANULOCYTES NFR BLD AUTO: 0 %
LYMPHOCYTES # BLD AUTO: 1.8 X10E3/UL (ref 0.7–3.1)
LYMPHOCYTES NFR BLD AUTO: 42 %
MCH RBC QN AUTO: 32.7 PG (ref 26.6–33)
MCHC RBC AUTO-ENTMCNC: 33.6 G/DL (ref 31.5–35.7)
MCV RBC AUTO: 98 FL (ref 79–97)
MONOCYTES # BLD AUTO: 0.3 X10E3/UL (ref 0.1–0.9)
MONOCYTES NFR BLD AUTO: 7 %
NEUTROPHILS # BLD AUTO: 2 X10E3/UL (ref 1.4–7)
NEUTROPHILS NFR BLD AUTO: 47 %
PLATELET # BLD AUTO: 301 X10E3/UL (ref 150–450)
POTASSIUM SERPL-SCNC: 4.3 MMOL/L (ref 3.5–5.2)
PROT SERPL-MCNC: 7.5 G/DL (ref 6–8.5)
RBC # BLD AUTO: 3.94 X10E6/UL (ref 3.77–5.28)
SODIUM SERPL-SCNC: 143 MMOL/L (ref 134–144)
TSH SERPL DL<=0.005 MIU/L-ACNC: 2.02 UIU/ML (ref 0.45–4.5)
WBC # BLD AUTO: 4.2 X10E3/UL (ref 3.4–10.8)

## 2019-08-15 ENCOUNTER — TELEPHONE (OUTPATIENT)
Dept: INTERNAL MEDICINE CLINIC | Age: 24
End: 2019-08-15

## 2019-08-15 NOTE — TELEPHONE ENCOUNTER
----- Message from Keyonna Ro sent at 8/15/2019  9:50 AM EDT -----  Regarding: Dr. Reynold Cheung: [phone]  Pt's mother Hamida Hsieh, stated the pt needs immunization records in order for the [facility] clinic to perform a part B meningitis shot. Pt's best contact number is [phone]. [facility] pharmacy number [phone].

## 2019-08-15 NOTE — TELEPHONE ENCOUNTER
Returned call to Denny.  She states she would like a copy of the immunization record to  in the office to take to [facility]. It has been printed and put at the  for

## 2019-09-05 ENCOUNTER — HOSPITAL ENCOUNTER (EMERGENCY)
Age: 24
Discharge: HOME | End: 2019-09-05
Attending: EMERGENCY MEDICINE
Payer: COMMERCIAL

## 2019-09-05 VITALS
HEIGHT: 66 IN | SYSTOLIC BLOOD PRESSURE: 128 MMHG | HEART RATE: 92 BPM | BODY MASS INDEX: 25.71 KG/M2 | DIASTOLIC BLOOD PRESSURE: 79 MMHG | WEIGHT: 160 LBS | RESPIRATION RATE: 17 BRPM | OXYGEN SATURATION: 99 % | TEMPERATURE: 98.3 F

## 2019-09-05 DIAGNOSIS — T78.40XA ALLERGIC REACTION, INITIAL ENCOUNTER: Primary | ICD-10-CM

## 2019-09-05 PROCEDURE — 99284 EMERGENCY DEPT VISIT MOD MDM: CPT

## 2019-09-05 PROCEDURE — 74011636637 HC RX REV CODE- 636/637: Performed by: EMERGENCY MEDICINE

## 2019-09-05 PROCEDURE — 74011250637 HC RX REV CODE- 250/637: Performed by: EMERGENCY MEDICINE

## 2019-09-05 RX ORDER — DIPHENHYDRAMINE HCL 25 MG
50 TABLET ORAL
Qty: 20 TAB | Refills: 0 | Status: SHIPPED | OUTPATIENT
Start: 2019-09-05

## 2019-09-05 RX ORDER — FAMOTIDINE 20 MG/1
20 TABLET, FILM COATED ORAL
Status: COMPLETED | OUTPATIENT
Start: 2019-09-05 | End: 2019-09-05

## 2019-09-05 RX ORDER — FAMOTIDINE 20 MG/1
20 TABLET, FILM COATED ORAL 2 TIMES DAILY
Qty: 8 TAB | Refills: 0 | Status: SHIPPED | OUTPATIENT
Start: 2019-09-05 | End: 2019-09-09

## 2019-09-05 RX ORDER — PREDNISONE 20 MG/1
60 TABLET ORAL DAILY
Qty: 12 TAB | Refills: 0 | Status: SHIPPED | OUTPATIENT
Start: 2019-09-05 | End: 2019-09-09

## 2019-09-05 RX ORDER — DIPHENHYDRAMINE HCL 25 MG
50 CAPSULE ORAL
Status: COMPLETED | OUTPATIENT
Start: 2019-09-05 | End: 2019-09-05

## 2019-09-05 RX ORDER — PREDNISONE 20 MG/1
60 TABLET ORAL ONCE
Status: COMPLETED | OUTPATIENT
Start: 2019-09-05 | End: 2019-09-05

## 2019-09-05 RX ADMIN — FAMOTIDINE 20 MG: 20 TABLET ORAL at 01:55

## 2019-09-05 RX ADMIN — PREDNISONE 60 MG: 20 TABLET ORAL at 01:55

## 2019-09-05 RX ADMIN — DIPHENHYDRAMINE HYDROCHLORIDE 50 MG: 25 CAPSULE ORAL at 01:55

## 2019-09-05 NOTE — DISCHARGE INSTRUCTIONS
Patient Education      take the Benadryl every 4-6 hours if hive reoccur for the next 3 days  Take he Prednisone daily for 4 more days  Take the Pepcid twice daily for 4 more days    If you symptoms worsen despite the above medications or you feel faint, difficulty breathing, etc. Use your epi pen and call 911/   Allergic Reaction: Care Instructions  Your Care Instructions    An allergic reaction is an excessive response from your immune system to a medicine, chemical, food, insect bite, or other substance. A reaction can range from mild to life-threatening. Some people have a mild rash, hives, and itching or stomach cramps. In severe reactions, swelling of your tongue and throat can close up your airway so that you cannot breathe. Follow-up care is a key part of your treatment and safety. Be sure to make and go to all appointments, and call your doctor if you are having problems. It's also a good idea to know your test results and keep a list of the medicines you take. How can you care for yourself at home? · If you know what caused your allergic reaction, be sure to avoid it. Your allergy may become more severe each time you have a reaction. · Take an over-the-counter antihistamine, such as cetirizine (Zyrtec) or loratadine (Claritin), to treat mild symptoms. Read and follow directions on the label. Some antihistamines can make you feel sleepy. Do not give antihistamines to a child unless you have checked with your doctor first. Mild symptoms include sneezing or an itchy or runny nose; an itchy mouth; a few hives or mild itching; and mild nausea or stomach discomfort. · Do not scratch hives or a rash. Put a cold, moist towel on them or take cool baths to relieve itching. Put ice packs on hives, swelling, or insect stings for 10 to 15 minutes at a time. Put a thin cloth between the ice pack and your skin. Do not take hot baths or showers. They will make the itching worse.   · Your doctor may prescribe a shot of epinephrine to carry with you in case you have a severe reaction. Learn how to give yourself the shot and keep it with you at all times. Make sure it is not . · Go to the emergency room every time you have a severe reaction, even if you have used your shot of epinephrine and are feeling better. Symptoms can come back after a shot. · Wear medical alert jewelry that lists your allergies. You can buy this at most drugstores. · If your child has a severe allergy, make sure that his or her teachers, babysitters, coaches, and other caregivers know about the allergy. They should have an epinephrine shot, know how and when to give it, and have a plan to take your child to the hospital.  When should you call for help? Give an epinephrine shot if:    · You think you are having a severe allergic reaction.     · You have symptoms in more than one body area, such as mild nausea and an itchy mouth.    After giving an epinephrine shot call 911, even if you feel better.   Call 911 if:    · You have symptoms of a severe allergic reaction. These may include:  ? Sudden raised, red areas (hives) all over your body. ? Swelling of the throat, mouth, lips, or tongue. ? Trouble breathing. ? Passing out (losing consciousness). Or you may feel very lightheaded or suddenly feel weak, confused, or restless.     · You have been given an epinephrine shot, even if you feel better.    Call your doctor now or seek immediate medical care if:    · You have symptoms of an allergic reaction, such as:  ? A rash or hives (raised, red areas on the skin). ? Itching. ? Swelling. ? Belly pain, nausea, or vomiting.    Watch closely for changes in your health, and be sure to contact your doctor if:    · You do not get better as expected. Where can you learn more? Go to http://darin-bhargav.info/. Enter A698 in the search box to learn more about \"Allergic Reaction: Care Instructions. \"  Current as of:  2019  Content Version: 12.1  © 8862-9065 [employer]. Care instructions adapted under license by [employer] (which disclaims liability or warranty for this information). If you have questions about a medical condition or this instruction, always ask your healthcare professional. [address] any warranty or liability for your use of this information.

## 2019-09-05 NOTE — ED TRIAGE NOTES
Triage: Abdominal cramping followed by diarrhea then noticed red bumps and itching.  + red bumps to arms, abdomen, legs. Reports this has happened once before was told it might have been from milk. Is being followed by an allergist and is on allergy shots weekly. Reports that she had frap from [place] that had almond milk in it. Denies any other new foods or medications.   Denies SOB

## 2019-09-05 NOTE — ED PROVIDER NOTES
HPI     39-year-old female with a history of autism, eczema, von Willebrand's disease, presents to the ED with possible allergic reaction. She states she drank a [employer] coffee with almond milk around 430. She does have a known allergy to cows milk. Around 1130 tonight she developed severe cramping abdominal pain with diarrhea and broke out into hives. 1 year ago she had similar reaction presented to the ED. Tonight she does report she had a little bit of shortness of breath. She did not take any medication or use her EpiPen prior to arrival.  She reports her symptoms have essentially resolved. Past Medical History:   Diagnosis Date    Abnormal thyroid stimulating hormone level     Autism spectrum disorder     pervasive development disorder not otherwise specified, autism    Eczema     Optic nerve disorder     enlarged optic nerve, glaucoma suspect, [facility]     trace bales/gyn-July hematolgy- DR. ZAPIEN       Past Surgical History:   Procedure Laterality Date    HX HEENT      oral surgery; X2    HX WISDOM TEETH EXTRACTION           Family History:   Problem Relation Age of Onset    Hypertension Mother     High Cholesterol Mother     Thyroid Disease Mother     Breast Cancer Mother 39    Cancer Mother         breast/thyroid    Diabetes Father     Hypertension Father     High Cholesterol Father     Thyroid Disease Father     No Known Problems Brother     Heart Disease Maternal Grandmother     Cancer Maternal Grandmother         colon cancer    Heart Disease Maternal Grandfather     Diabetes Maternal Grandfather     Heart Attack Maternal Grandfather     Diabetes Sister     No Known Problems Brother        Social History     Socioeconomic History    Marital status: SINGLE     Spouse name: Not on file    Number of children: Not on file    Years of education: Not on file    Highest education level: Not on file   Occupational History    Not on file Social Needs    Financial resource strain: Not on file    Food insecurity:     Worry: Not on file     Inability: Not on file    Transportation needs:     Medical: Not on file     Non-medical: Not on file   Tobacco Use    Smoking status: Never Smoker    Smokeless tobacco: Never Used   Substance and Sexual Activity    Alcohol use: Yes     Comment: rarely    Drug use: No    Sexual activity: Never   Lifestyle    Physical activity:     Days per week: Not on file     Minutes per session: Not on file    Stress: Not on file   Relationships    Social connections:     Talks on phone: Not on file     Gets together: Not on file     Attends Islam service: Not on file     Active member of club or organization: Not on file     Attends meetings of clubs or organizations: Not on file     Relationship status: Not on file    Intimate partner violence:     Fear of current or ex partner: Not on file     Emotionally abused: Not on file     Physically abused: Not on file     Forced sexual activity: Not on file   Other Topics Concern    Not on file   Social History Narrative    Full time student at [employer], music education            Manageable        Sleeps 7 hours/night        Exercise     walking a dog 1 mile         ALLERGIES: Milk; Other medication; and Other plant, animal, environmental    Review of Systems   Constitutional: Negative for fever. HENT: Negative for congestion. Eyes: Negative for visual disturbance. Respiratory: Negative for cough and shortness of breath. Cardiovascular: Negative for chest pain. Gastrointestinal: Positive for abdominal pain and diarrhea. Negative for nausea. Endocrine: Negative for polyuria. Genitourinary: Negative for dysuria. Musculoskeletal: Negative for gait problem. Skin: Negative for rash. Neurological: Negative for headaches. Psychiatric/Behavioral: Negative for dysphoric mood.        Vitals:    09/05/19 0103   BP: 131/86   Pulse: 92 Resp: 17   Temp: 98.3 °F (36.8 °C)   SpO2: 100%   Weight: 72.6 kg (160 lb)   Height: 5' 6\" (1.676 m)            Physical Exam   Constitutional: She is oriented to person, place, and time. She appears well-developed and well-nourished. No distress. HENT:   Head: Normocephalic and atraumatic. Mouth/Throat: No oropharyngeal exudate. Eyes: Pupils are equal, round, and reactive to light. Right eye exhibits no discharge. Left eye exhibits no discharge. No scleral icterus. Neck: Normal range of motion. Neck supple. No JVD present. Cardiovascular: Normal rate, regular rhythm and normal heart sounds. No murmur heard. Pulmonary/Chest: Effort normal and breath sounds normal. No stridor. No respiratory distress. She has no wheezes. She has no rales. She exhibits no tenderness. Abdominal: Soft. Bowel sounds are normal. She exhibits no distension and no mass. There is no tenderness. There is no rebound and no guarding. Musculoskeletal: Normal range of motion. Neurological: She is oriented to person, place, and time. Skin: Skin is warm and dry. Capillary refill takes less than 2 seconds. No rash noted. Faint erythematous rash   Psychiatric: She has a normal mood and affect. Her behavior is normal. Judgment and thought content normal.        MDM       Procedures        Pepcid, benadryl and prednisone. Observe.  Anticipate d/c

## 2019-12-15 ENCOUNTER — HOSPITAL ENCOUNTER (EMERGENCY)
Age: 24
Discharge: HOME | End: 2019-12-15
Attending: EMERGENCY MEDICINE
Payer: COMMERCIAL

## 2019-12-15 VITALS
SYSTOLIC BLOOD PRESSURE: 127 MMHG | RESPIRATION RATE: 18 BRPM | HEART RATE: 88 BPM | WEIGHT: 160 LBS | OXYGEN SATURATION: 97 % | TEMPERATURE: 98.2 F | BODY MASS INDEX: 25.71 KG/M2 | HEIGHT: 66 IN | DIASTOLIC BLOOD PRESSURE: 78 MMHG

## 2019-12-15 DIAGNOSIS — T78.40XA ALLERGIC REACTION, INITIAL ENCOUNTER: Primary | ICD-10-CM

## 2019-12-15 PROCEDURE — 74011250636 HC RX REV CODE- 250/636: Performed by: EMERGENCY MEDICINE

## 2019-12-15 PROCEDURE — 96374 THER/PROPH/DIAG INJ IV PUSH: CPT

## 2019-12-15 PROCEDURE — 99284 EMERGENCY DEPT VISIT MOD MDM: CPT

## 2019-12-15 RX ORDER — PREDNISONE 10 MG/1
TABLET ORAL
Qty: 21 TAB | Refills: 0 | Status: SHIPPED | OUTPATIENT
Start: 2019-12-15 | End: 2020-03-12 | Stop reason: ALTCHOICE

## 2019-12-15 RX ORDER — PREDNISONE 10 MG/1
TABLET ORAL
Qty: 21 TAB | Refills: 0 | Status: SHIPPED | OUTPATIENT
Start: 2019-12-15 | End: 2019-12-15

## 2019-12-15 RX ORDER — DIPHENHYDRAMINE HCL 25 MG
50 CAPSULE ORAL
Qty: 100 CAP | Refills: 0 | Status: SHIPPED | OUTPATIENT
Start: 2019-12-15 | End: 2019-12-25

## 2019-12-15 RX ORDER — FAMOTIDINE 20 MG/1
20 TABLET, FILM COATED ORAL 2 TIMES DAILY
Qty: 20 TAB | Refills: 0 | Status: SHIPPED | OUTPATIENT
Start: 2019-12-15 | End: 2019-12-25

## 2019-12-15 RX ORDER — SODIUM CHLORIDE 0.9 % (FLUSH) 0.9 %
5-40 SYRINGE (ML) INJECTION EVERY 8 HOURS
Status: DISCONTINUED | OUTPATIENT
Start: 2019-12-15 | End: 2019-12-16 | Stop reason: HOSPADM

## 2019-12-15 RX ORDER — SODIUM CHLORIDE 0.9 % (FLUSH) 0.9 %
5-40 SYRINGE (ML) INJECTION AS NEEDED
Status: DISCONTINUED | OUTPATIENT
Start: 2019-12-15 | End: 2019-12-16 | Stop reason: HOSPADM

## 2019-12-15 RX ORDER — EPINEPHRINE 0.3 MG/.3ML
0.3 INJECTION SUBCUTANEOUS
Qty: 2 SYRINGE | Refills: 0 | Status: SHIPPED | OUTPATIENT
Start: 2019-12-15 | End: 2019-12-15

## 2019-12-15 RX ADMIN — METHYLPREDNISOLONE SODIUM SUCCINATE 125 MG: 125 INJECTION, POWDER, FOR SOLUTION INTRAMUSCULAR; INTRAVENOUS at 19:25

## 2019-12-15 RX ADMIN — SODIUM CHLORIDE 1000 ML: 900 INJECTION, SOLUTION INTRAVENOUS at 19:25

## 2019-12-15 NOTE — ED TRIAGE NOTES
Patient here with allergic reaction, Delphine Morales. Started with abd cramping and diarrhea, now with redness and rash throughout body. Denies any SOB or throat issues. Patient took epipen at 200, also Pepcid and 50 mg of Benadryl.

## 2019-12-16 NOTE — DISCHARGE INSTRUCTIONS
We hope that we have addressed all of your medical concerns. The examination and treatment you received in the Emergency Department were for an emergent problem and were not intended as complete care. It is important that you follow up with your healthcare provider(s) for ongoing care. If your symptoms worsen or do not improve as expected, and you are unable to reach your usual health care provider(s), you should return to the Emergency Department. Today's healthcare is undergoing tremendous change, and patient satisfaction surveys are one of the many tools to assess the quality of medical care. You may receive a survey from the [employer] regarding your experience in the Emergency Department. I hope that your experience has been completely positive, particularly the medical care that I provided. As such, please participate in the survey; anything less than excellent does not meet my expectations or intentions. [facility] and [address] [place] [address] participate in nationally recognized quality of care measures. If your blood pressure is greater than 120/80, as reported below, we urge that you seek medical care to address the potential of high blood pressure, commonly known as hypertension. Hypertension can be hereditary or can be caused by certain medical conditions, pain, stress, or \"white coat syndrome. \"       Please make an appointment with your health care provider(s) for follow up of your Emergency Department visit. VITALS:   Patient Vitals for the past 8 hrs:   Temp Pulse Resp BP SpO2   12/15/19 1857 98.2 °F (36.8 °C) (!) 109 20 133/71 100 %          Thank you for allowing us to provide you with medical care today. We realize that you have many choices for your emergency care needs. Please choose us in the future for any continued health care needs. Ashly Dozier, [address]. Office: [phone]            No results found for this or any previous visit (from the past 24 hour(s)). No results found. Patient Education        Allergic Reaction: Care Instructions  Your Care Instructions    An allergic reaction is an excessive response from your immune system to a medicine, chemical, food, insect bite, or other substance. A reaction can range from mild to life-threatening. Some people have a mild rash, hives, and itching or stomach cramps. In severe reactions, swelling of your tongue and throat can close up your airway so that you cannot breathe. Follow-up care is a key part of your treatment and safety. Be sure to make and go to all appointments, and call your doctor if you are having problems. It's also a good idea to know your test results and keep a list of the medicines you take. How can you care for yourself at home? · If you know what caused your allergic reaction, be sure to avoid it. Your allergy may become more severe each time you have a reaction. · Take an over-the-counter antihistamine, such as cetirizine (Zyrtec) or loratadine (Claritin), to treat mild symptoms. Read and follow directions on the label. Some antihistamines can make you feel sleepy. Do not give antihistamines to a child unless you have checked with your doctor first. Mild symptoms include sneezing or an itchy or runny nose; an itchy mouth; a few hives or mild itching; and mild nausea or stomach discomfort. · Do not scratch hives or a rash. Put a cold, moist towel on them or take cool baths to relieve itching. Put ice packs on hives, swelling, or insect stings for 10 to 15 minutes at a time. Put a thin cloth between the ice pack and your skin. Do not take hot baths or showers. They will make the itching worse. · Your doctor may prescribe a shot of epinephrine to carry with you in case you have a severe reaction. Learn how to give yourself the shot and keep it with you at all times.  Make sure it is not . · Go to the emergency room every time you have a severe reaction, even if you have used your shot of epinephrine and are feeling better. Symptoms can come back after a shot. · Wear medical alert jewelry that lists your allergies. You can buy this at most [employer]. · If your child has a severe allergy, make sure that his or her teachers, babysitters, coaches, and other caregivers know about the allergy. They should have an epinephrine shot, know how and when to give it, and have a plan to take your child to the hospital.  When should you call for help? Give an epinephrine shot if:    · You think you are having a severe allergic reaction.     · You have symptoms in more than one body area, such as mild nausea and an itchy mouth.    After giving an epinephrine shot call 911, even if you feel better.   Call 911 if:    · You have symptoms of a severe allergic reaction. These may include:  ? Sudden raised, red areas (hives) all over your body. ? Swelling of the throat, mouth, lips, or tongue. ? Trouble breathing. ? Passing out (losing consciousness). Or you may feel very lightheaded or suddenly feel weak, confused, or restless.     · You have been given an epinephrine shot, even if you feel better.    Call your doctor now or seek immediate medical care if:    · You have symptoms of an allergic reaction, such as:  ? A rash or hives (raised, red areas on the skin). ? Itching. ? Swelling. ? Belly pain, nausea, or vomiting.    Watch closely for changes in your health, and be sure to contact your doctor if:    · You do not get better as expected. Where can you learn more? Go to http://darin-bhargav.info/. Enter I958 in the search box to learn more about \"Allergic Reaction: Care Instructions. \"  Current as of: 2019  Content Version: 12.2  © 6637-9379 [facility].  Care instructions adapted under license by [employer] (which disclaims liability or warranty for this information). If you have questions about a medical condition or this instruction, always ask your healthcare professional. [place] [address] any warranty or liability for your use of this information.

## 2019-12-16 NOTE — ED NOTES
Patient discharged from ED by provider. Discharge instructions reviewed with patient and all questions answered. Patient ambulatory from ED in [facility].

## 2019-12-17 NOTE — ED PROVIDER NOTES
Patient here with allergic reaction, Laci Merino. Started with abd cramping and diarrhea, now with redness and rash throughout body. Denies any SOB or throat issues. Patient took epipen at 200, also Pepcid and 50 mg of Benadryl. The history is provided by the patient and a relative. No  was used. Allergic Reaction    This is a new problem. The current episode started 1 to 2 hours ago. The problem has been gradually worsening. Pertinent negatives include no vomiting, no suicidal ideas, no confusion and no shortness of breath. Past Medical History:   Diagnosis Date    Abnormal thyroid stimulating hormone level     Autism spectrum disorder     pervasive development disorder not otherwise specified, autism    Eczema     Optic nerve disorder     enlarged optic nerve, glaucoma suspect, Dr. Alberta Hurtado disease ([facility]     trace bales/gyn-July hematolgy- DR. ZAPIEN       Past Surgical History:   Procedure Laterality Date    HX HEENT      oral surgery; X2    HX WISDOM TEETH EXTRACTION           Family History:   Problem Relation Age of Onset    Hypertension Mother     High Cholesterol Mother     Thyroid Disease Mother     Breast Cancer Mother 39    Cancer Mother         breast/thyroid    Diabetes Father     Hypertension Father     High Cholesterol Father     Thyroid Disease Father     No Known Problems Brother     Heart Disease Maternal Grandmother     Cancer Maternal Grandmother         colon cancer    Heart Disease Maternal Grandfather     Diabetes Maternal Grandfather     Heart Attack Maternal Grandfather     Diabetes Sister     No Known Problems Brother        Social History     Socioeconomic History    Marital status: SINGLE     Spouse name: Not on file    Number of children: Not on file    Years of education: Not on file    Highest education level: Not on file   Occupational History    Not on file   Social Needs    Financial resource strain: Not on file    Food insecurity:     Worry: Not on file     Inability: Not on file    Transportation needs:     Medical: Not on file     Non-medical: Not on file   Tobacco Use    Smoking status: Never Smoker    Smokeless tobacco: Never Used   Substance and Sexual Activity    Alcohol use: Yes     Comment: rarely    Drug use: No    Sexual activity: Never   Lifestyle    Physical activity:     Days per week: Not on file     Minutes per session: Not on file    Stress: Not on file   Relationships    Social connections:     Talks on phone: Not on file     Gets together: Not on file     Attends [employer] service: Not on file     Active member of club or organization: Not on file     Attends meetings of clubs or organizations: Not on file     Relationship status: Not on file    Intimate partner violence:     Fear of current or ex partner: Not on file     Emotionally abused: Not on file     Physically abused: Not on file     Forced sexual activity: Not on file   Other Topics Concern    Not on file   Social History Narrative    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 HENT: Negative for ear pain, hearing loss, rhinorrhea and trouble swallowing. Eyes: Negative for pain and visual disturbance. Respiratory: Negative for cough, chest tightness and shortness of breath. Cardiovascular: Negative for chest pain and palpitations. Gastrointestinal: Negative for abdominal distention, abdominal pain, blood in stool and vomiting. Genitourinary: Negative for dysuria, hematuria and urgency. Musculoskeletal: Negative for back pain and myalgias. Skin: Positive for rash.    Neurological: Negative for dizziness, syncope, weakness and numbness. Psychiatric/Behavioral: Negative for confusion and suicidal ideas. All other systems reviewed and are negative. Vitals:    12/15/19 2000 12/15/19 2015 12/15/19 2030 12/15/19 2045   BP: 123/75 124/81 120/79 127/78   Pulse: 87  82 88   Resp:    18   Temp:       SpO2: 98%  97% 97%   Weight:       Height:                Physical Exam  Vitals signs and nursing note reviewed. Constitutional:       General: She is not in acute distress. Appearance: She is well-developed. She is not diaphoretic. HENT:      Head: Normocephalic and atraumatic. Right Ear: External ear normal.      Left Ear: External ear normal.      Nose: Nose normal.      Mouth/Throat:      Mouth: Mucous membranes are moist. No angioedema. Pharynx: No oropharyngeal exudate. Eyes:      General: No scleral icterus. Right eye: No discharge. Left eye: No discharge. Conjunctiva/sclera: Conjunctivae normal.      Pupils: Pupils are equal, round, and reactive to light. Neck:      Musculoskeletal: Normal range of motion and neck supple. Vascular: No JVD. Trachea: No tracheal deviation. Cardiovascular:      Rate and Rhythm: Regular rhythm. Tachycardia present. Heart sounds: Normal heart sounds. No murmur. No friction rub. No gallop. Pulmonary:      Effort: Pulmonary effort is normal. No respiratory distress. Breath sounds: Normal breath sounds. No stridor. No decreased breath sounds, wheezing, rhonchi or rales. Chest:      Chest wall: No tenderness. Abdominal:      General: Bowel sounds are normal. There is no distension. Palpations: Abdomen is soft. Tenderness: There is no tenderness. There is no guarding or rebound. Musculoskeletal: Normal range of motion. General: No tenderness. Skin:     General: Skin is warm and dry. Capillary Refill: Capillary refill takes less than 2 seconds. Coloration: Skin is not pale.       Findings: Erythema and rash present. Rash is urticarial.      Comments: diffuse   Neurological:      Mental Status: She is alert and oriented to person, place, and time. GCS: GCS eye subscore is 4. GCS verbal subscore is 5. GCS motor subscore is 6. Cranial Nerves: No cranial nerve deficit. Sensory: No sensory deficit. Motor: No abnormal muscle tone. Coordination: Coordination normal.      Deep Tendon Reflexes: Reflexes are normal and symmetric. Reflexes normal.   Psychiatric:         Behavior: Behavior normal.         Thought Content: Thought content normal.         Judgment: Judgment normal.          MDM  Number of Diagnoses or Management Options  Allergic reaction, initial encounter:   Risk of Complications, Morbidity, and/or Mortality  Presenting problems: high  Diagnostic procedures: low  Management options: moderate    Critical Care  Total time providing critical care: 30-74 minutes (Total critical care time spent exclusive of procedures:  35 minutes)    Patient Progress  Patient progress: improved       Procedures    Chief Complaint   Patient presents with    Allergic Reaction       The patient's presenting problems have been discussed, and they are in agreement with the care plan formulated and outlined with them. I have encouraged them to ask questions as they arise throughout their visit. MEDICATIONS GIVEN:  Medications   methylPREDNISolone (PF) (Solu-MEDROL) injection 125 mg (125 mg IntraVENous Given 12/15/19 1925)   sodium chloride 0.9 % bolus infusion 1,000 mL (0 mL IntraVENous IV Completed 12/15/19 2047)       LABS REVIEWED:  No results found for this or any previous visit (from the past 24 hour(s)).     VITAL SIGNS:  Patient Vitals for the past 24 hrs:   Pulse Resp BP SpO2   12/15/19 2045 88 18 127/78 97 %   12/15/19 2030 82  120/79 97 %   12/15/19 2015   124/81    12/15/19 2000 87  123/75 98 %   12/15/19 1945 88  121/84 96 %   12/15/19 1930 94  129/83 96 %       RADIOLOGY RESULTS:  The following have been ordered and reviewed:  No results found. PROGRESS NOTES:  Discussed results and plan with patient and family. Patient will be discharged home with PCP and allergist follow up. Patient instructed to return to the emergency room for any worsening symptoms or any other concerns. DIAGNOSIS:    1. Allergic reaction, initial encounter        PLAN:  Follow-up Information     Follow up With Specialties Details Why Contact Info    Swathi Sol MD Internal Medicine In 1 week  [address]  [facility] [id number]  [phone]      [facility] EMERGENCY DEPT Emergency Medicine  If symptoms worsen [address] [facility]  [phone]        Discharge Medication List as of 12/15/2019  8:46 PM      START taking these medications    Details   famotidine (PEPCID) 20 mg tablet Take 1 Tab by mouth two (2) times a day for 10 days. , Print, Disp-20 Tab, R-0      diphenhydrAMINE (BENADRYL) 25 mg capsule Take 2 Caps by mouth every six (6) hours as needed for Itching for up to 10 days. , Print, Disp-100 Cap, R-0      !! EPINEPHrine (EPIPEN) 0.3 mg/0.3 mL injection 0.3 mL by IntraMUSCular route once as needed for Anaphylaxis or Allergic Response for up to 1 dose., Print, Disp-2 Syringe, R-0      predniSONE (STERAPRED DS) 10 mg dose pack Take as directed., Normal, Disp-21 Tab, R-0       !! - Potential duplicate medications found. Please discuss with provider. CONTINUE these medications which have NOT CHANGED    Details   diphenhydrAMINE (BENADRYL ALLERGY) 25 mg tablet Take 2 Tabs by mouth every six (6) hours as needed for Sleep., Print, Disp-20 Tab, R-0      !! EPINEPHrine (EPIPEN) 0.3 mg/0.3 mL injection 0.3 mg by IntraMUSCular route once as needed., Historical Med      norgestrel-ethinyl estradiol (LOW-OGESTREL, 28,) 0.3-30 mg-mcg tab TAKE 1 TABLET BY MOUTH DAILY, Normal, Disp-3 Package, R-4      fexofenadine (ALLEGRA) 180 mg tablet Take  by mouth daily. , Historical Med      multivitamin (ONE A DAY) tablet Take 1 Tab by mouth daily. , Historical Med       !! - Potential duplicate medications found. Please discuss with provider. ED COURSE: The patient's hospital course has been uncomplicated.

## 2020-03-12 ENCOUNTER — OFFICE VISIT (OUTPATIENT)
Dept: INTERNAL MEDICINE CLINIC | Age: 25
End: 2020-03-12

## 2020-03-12 VITALS
HEIGHT: 66 IN | BODY MASS INDEX: 25.81 KG/M2 | RESPIRATION RATE: 12 BRPM | SYSTOLIC BLOOD PRESSURE: 125 MMHG | WEIGHT: 160.6 LBS | DIASTOLIC BLOOD PRESSURE: 86 MMHG | OXYGEN SATURATION: 98 % | TEMPERATURE: 98.8 F | HEART RATE: 78 BPM

## 2020-03-12 DIAGNOSIS — Z23 IMMUNIZATION DUE: ICD-10-CM

## 2020-03-12 DIAGNOSIS — Z00.00 WELL ADULT EXAM: Primary | ICD-10-CM

## 2020-03-12 NOTE — PROGRESS NOTES
Yimi Bolivar is a 21 y.o. female and presents with Complete Physical  .    Subjective:  Patient presents with her mother for her physical.  Patient is in her suzanne year at [address] [place] [address]. 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    Lived in [place], [place]    Pt has diagnosis of Von Willebrands disease and was followed by [address] [place] [address] Dr. Babak Zamudio but now is being seen by Dr. Yareli Londono. She has not required any DDAVP over the past year or since her last visit with me. She is being followed by Dr. Nargis Donnelly for her birth control pills. Review of Systems  Constitutional: negative for fevers, chills, anorexia and weight loss  Eyes:   negative for visual disturbance and irritation  ENT:   negative for tinnitus,sore throat,nasal congestion,ear pains. hoarseness  Respiratory:  negative for cough, hemoptysis, dyspnea,wheezing  CV:   negative for chest pain, palpitations, lower extremity edema  GI:   negative for nausea, vomiting, diarrhea, abdominal pain,melena  Endo:               negative for polyuria,polydipsia,polyphagia,heat intolerance  Genitourinary: negative for frequency, dysuria and hematuria  Integument:  negative for rash and pruritus  Hematologic:  negative for easy bruising and gum/nose bleeding  Musculoskel: negative for myalgias, arthralgias, back pain, muscle weakness, joint pain  Neurological:  negative for headaches, dizziness, vertigo, memory problems and gait   Behavl/Psych: negative for feelings of anxiety, depression, mood changes    Past Medical History:   Diagnosis Date    Allergy     environmental allergic to trees and nut trees    Autism spectrum disorder     pervasive development disorder not otherwise specified, autism    Eczema     Optic nerve disorder     enlarged optic nerve, glaucoma suspect, [facility] Ess[facility]     trace bales/gyn-July hematolgy- DR. Jeanette Menon, Dr. 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[employer], Music Ed    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    Music therapy, music education    Manageable stress        Sleeps 6-7 hours/night        Exercise    Not regularly     Family History   Problem Relation Age of Onset    Hypertension Mother     High Cholesterol Mother     Thyroid Disease Mother     Breast Cancer Mother 39    Cancer Mother         breast/thyroid    Diabetes Father     Hypertension Father     High Cholesterol Father     Thyroid Disease Father     No Known Problems Brother     Heart Disease Maternal Grandmother     Cancer Maternal Grandmother         colon cancer    Heart Disease Maternal Grandfather     Diabetes Maternal Grandfather     Heart Attack Maternal Grandfather     Diabetes Sister     No Known Problems Brother      Current Outpatient Medications   Medication Sig Dispense Refill    allergy injection       diphenhydrAMINE (BENADRYL ALLERGY) 25 mg tablet Take 2 Tabs by mouth every six (6) hours as needed for Sleep. 20 Tab 0    EPINEPHrine (EPIPEN) 0.3 mg/0.3 mL injection 0.3 mg by IntraMUSCular route once as needed.  norgestrel-ethinyl estradiol (LOW-OGESTREL, 28,) 0.3-30 mg-mcg tab TAKE 1 TABLET BY MOUTH DAILY 3 Package 4    fexofenadine (ALLEGRA) 180 mg tablet Take  by mouth daily.  multivitamin (ONE A DAY) tablet Take 1 Tab by mouth daily.        Allergies   Allergen Reactions    Milk Nausea and Vomiting    Other Medication Rash     Neosporin    Other Plant, Animal, Environmental Not Reported This Time     Cats       Objective:  Visit Vitals  /86 (BP 1 Location: Left arm, BP Patient Position: Sitting)   Pulse 78   Temp 98.8 °F (37.1 °C) (Oral)   Resp 12   Ht 5' 6\" (1.676 m)   Wt 160 lb 9.6 oz (72.8 kg)   LMP 03/09/2020 (Exact Date)   SpO2 98%   BMI 25.92 kg/m²     Physical Exam:   General appearance - alert, well appearing, and in no distress  Mental status - alert, oriented to person, place, and time  EYE-EMRE, EOMI, corneas normal, no foreign bodies, visual acuity normal both eyes, no periorbital cellulitis  ENT-ENT exam normal, no neck nodes or sinus tenderness  Nose - normal and patent, no erythema, discharge or polyps  Mouth - mucous membranes moist, pharynx normal without lesions  Neck - supple, no significant adenopathy, fullness right thyroid  Chest - clear to auscultation, no wheezes, rales or rhonchi, symmetric air entry   Heart - normal rate, regular rhythm, normal S1, S2, no murmurs, rubs, clicks or gallops   Abdomen - soft, nontender, nondistended, no masses or organomegaly  Lymph- no adenopathy palpable  Ext-peripheral pulses normal, no pedal edema, no clubbing or cyanosis  Skin-Warm and dry.  no hyperpigmentation, vitiligo, or suspicious lesions  Neuro -alert, oriented, normal speech, no focal findings or movement disorder noted  Neck-normal C-spine, no tenderness, full ROM without pain      Results for orders placed or performed in visit on 07/29/19   CBC WITH AUTOMATED DIFF   Result Value Ref Range    WBC 4.2 3.4 - 10.8 x10E3/uL    RBC 3.94 3.77 - 5.28 x10E6/uL    HGB 12.9 11.1 - 15.9 g/dL    HCT 38.4 34.0 - 46.6 %    MCV 98 (H) 79 - 97 fL    MCH 32.7 26.6 - 33.0 pg    MCHC 33.6 31.5 - 35.7 g/dL    RDW 13.1 12.3 - 15.4 %    PLATELET 407 872 - 623 x10E3/uL    NEUTROPHILS 47 Not Estab. %    Lymphocytes 42 Not Estab. %    MONOCYTES 7 Not Estab. %    EOSINOPHILS 3 Not Estab. %    BASOPHILS 1 Not Estab. % ABS. NEUTROPHILS 2.0 1.4 - 7.0 x10E3/uL    Abs Lymphocytes 1.8 0.7 - 3.1 x10E3/uL    ABS. MONOCYTES 0.3 0.1 - 0.9 x10E3/uL    ABS. EOSINOPHILS 0.1 0.0 - 0.4 x10E3/uL    ABS. BASOPHILS 0.0 0.0 - 0.2 x10E3/uL    IMMATURE GRANULOCYTES 0 Not Estab. %    ABS. IMM. GRANS. 0.0 0.0 - 0.1 [id number]   METABOLIC PANEL, COMPREHENSIVE   Result Value Ref Range    Glucose 71 65 - 99 mg/dL    BUN 7 6 - 20 mg/dL    Creatinine 0.71 0.57 - 1.00 mg/dL    GFR est non- >59 mL/min/1.73    GFR est  >59 mL/min/1.73    BUN/Creatinine ratio 10 9 - 23    Sodium 143 134 - 144 mmol/L    Potassium 4.3 3.5 - 5.2 mmol/L    Chloride 105 96 - 106 mmol/L    CO2 24 20 - 29 mmol/L    Calcium 9.8 8.7 - 10.2 mg/dL    Protein, total 7.5 6.0 - 8.5 g/dL    Albumin 4.8 3.5 - 5.5 g/dL    GLOBULIN, TOTAL 2.7 1.5 - 4.5 g/dL    A-G Ratio 1.8 1.2 - 2.2    Bilirubin, total 0.4 0.0 - 1.2 mg/dL    Alk. 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 Auguste Gravely abnormal bleeding/discharge/abd pain/pressure dr. Sarah bales      Thyroid sx    Osteopenia prevention  Calcium 1000mg/day yes  Vitamin D 800iu/day yes  Milk allergy    Mental health scale: 9/10   Depression  Anxiety  Sleep # of hours:  Energy Level:        Immunizations  TDAP  Pneumonia vaccine  Flu vaccine  Shingles vaccine  HPV      Diagnoses and all orders for this visit:    1. Well adult exam  Patient appears to be in overall good health. Encouraged regular exercise  Prevention as noted above      2.  Immunization due  -     REFERRAL TO DERMATOLOGY  -     meningococcal B,4-CMP PF vaccine (BEXSERO) 50-50-50-25 mcg/0.5 mL injection; 0.5 mL by IntraMUSCular route once for 1 dose.  -     diphtheria-pertussis, acellular,-tetanus (ACELL) 2.5-8-5 Lf-mcg-Lf/0.5mL susp susp; 0.5 mL by IntraMUSCular route once for 1 dose.

## 2020-06-26 RX ORDER — NORGESTREL AND ETHINYL ESTRADIOL 0.3-0.03MG
KIT ORAL
Qty: 84 TAB | OUTPATIENT
Start: 2020-06-26

## 2020-06-26 NOTE — TELEPHONE ENCOUNTER
Requesting refill of Low-Ogestrel 28    Last AE 4/12/19    Needs updated annual, one not scheduled as of yet.  Will decline rx until ae set

## 2020-06-29 ENCOUNTER — TELEPHONE (OUTPATIENT)
Dept: OBGYN CLINIC | Age: 25
End: 2020-06-29

## 2020-06-29 RX ORDER — NORGESTREL AND ETHINYL ESTRADIOL 0.3-0.03MG
KIT ORAL
Qty: 1 PACKAGE | Refills: 1 | Status: SHIPPED | OUTPATIENT
Start: 2020-06-29 | End: 2020-07-29 | Stop reason: SDUPTHER

## 2020-06-29 NOTE — TELEPHONE ENCOUNTER
Patient of [address] [place] [address] Rd    Calling to get refill of her birth control to last her until AE      Last AE 4/12/19    Next AE  7/29/20 with Nolan Richards

## 2020-07-29 RX ORDER — NORGESTREL AND ETHINYL ESTRADIOL 0.3-0.03MG
KIT ORAL
Qty: 1 PACKAGE | Refills: 0 | Status: SHIPPED | OUTPATIENT
Start: 2020-07-29

## 2023-10-05 ENCOUNTER — OFFICE VISIT (OUTPATIENT)
Age: 28
End: 2023-10-05

## 2023-10-05 VITALS — HEIGHT: 66 IN | BODY MASS INDEX: 26.36 KG/M2 | WEIGHT: 164 LBS

## 2023-10-05 DIAGNOSIS — N64.4 MASTODYNIA OF LEFT BREAST: Primary | ICD-10-CM

## 2023-10-05 NOTE — PROGRESS NOTES
HISTORY OF PRESENT ILLNESS  Tyler Underwood is a 33 y.o. female     HPI NEW patient consult referred by  Dr. Samantha Rader for LEFT breast nodule, present for 2 yrs, no change. US  was negative. Now it is sometimes painful. Here with her mother      Family History  Mother had breast cancer age 40, thyroid cancer age 44 to 36, survivor    Pt had LEFT breast US 0n 2021 @ [facility]   Result was neg    Mammogram, n/a, BIRADS n/a  Past Medical History:   Diagnosis Date    Allergy     environmental allergic to trees and nut trees    Autism spectrum disorder     pervasive development disorder not otherwise specified, autism    Eczema     Optic nerve disorder     enlarged optic nerve, glaucoma suspect, Dr. Stewart Bryant disease ([address])     trace javier/gyn-July hematolgy- DR. Severo Guillaume, Dr. Lombardi Form     Past Surgical History:   Procedure Laterality Date    HEENT      oral surgery; X2    WISDOM TOOTH EXTRACTION        OB History          0    Para        Term   0       0    AB   0    Living   0         SAB        IAB        Ectopic        Molar        Multiple        Live Births              Obstetric Comments   Menarche 14, LMP 22, # of children 0, age of 1st delivery n/a, Hysterectomy/oophorectomy no/no, Breast bx no, history of breast feeding no, BCP yes, Hormone therapy no             Family History   Problem Relation Age of Onset    High Cholesterol Father     Heart Attack Maternal Grandfather     No Known Problems Brother     Heart Disease Maternal Grandmother     Cancer Maternal Grandmother         colon cancer    Heart Disease Maternal Grandfather     Diabetes Maternal Grandfather     No Known Problems Brother     Hypertension Mother     High Cholesterol Mother     Thyroid Disease Mother     Breast Cancer Mother 39    Cancer Mother         breast/thyroid    Diabetes Father     Hypertension Father     Diabetes Sister     Thyroid Disease Father      Social History     Tobacco